# Patient Record
Sex: FEMALE | Race: WHITE | NOT HISPANIC OR LATINO | Employment: OTHER | URBAN - METROPOLITAN AREA
[De-identification: names, ages, dates, MRNs, and addresses within clinical notes are randomized per-mention and may not be internally consistent; named-entity substitution may affect disease eponyms.]

---

## 2020-06-21 ENCOUNTER — TRANSFERRED RECORDS (OUTPATIENT)
Dept: HEALTH INFORMATION MANAGEMENT | Facility: CLINIC | Age: 85
End: 2020-06-21

## 2020-11-03 LAB
CHOLESTEROL (EXTERNAL): 183 MG/DL (ref 0–199)
HDLC SERPL-MCNC: 82 MG/DL
LDL CHOLESTEROL CALCULATED (EXTERNAL): 88 MG/DL (ref 0–130)
TRIGLYCERIDES (EXTERNAL): 64 MG/DL (ref 0–149)

## 2022-07-28 ENCOUNTER — TRANSFERRED RECORDS (OUTPATIENT)
Dept: HEALTH INFORMATION MANAGEMENT | Facility: CLINIC | Age: 87
End: 2022-07-28

## 2022-08-24 ENCOUNTER — OFFICE VISIT (OUTPATIENT)
Dept: OPHTHALMOLOGY | Facility: CLINIC | Age: 87
End: 2022-08-24
Payer: COMMERCIAL

## 2022-08-24 DIAGNOSIS — H40.013 OPEN ANGLE GLAUCOMA CUPPING OF OPTIC DISCS, BILATERAL: ICD-10-CM

## 2022-08-24 DIAGNOSIS — H57.11 BLIND PAINFUL RIGHT EYE: Primary | ICD-10-CM

## 2022-08-24 DIAGNOSIS — H34.8112 STABLE CENTRAL RETINAL VEIN OCCLUSION OF RIGHT EYE (H): ICD-10-CM

## 2022-08-24 DIAGNOSIS — H54.40 BLIND PAINFUL RIGHT EYE: Primary | ICD-10-CM

## 2022-08-24 PROCEDURE — 99204 OFFICE O/P NEW MOD 45 MIN: CPT | Performed by: OPHTHALMOLOGY

## 2022-08-24 RX ORDER — LATANOPROST 50 UG/ML
SOLUTION/ DROPS OPHTHALMIC
Status: ON HOLD | COMMUNITY
Start: 2022-07-07 | End: 2023-12-07

## 2022-08-24 RX ORDER — HYDROCODONE BITARTRATE AND ACETAMINOPHEN 5; 325 MG/1; MG/1
TABLET ORAL
Status: ON HOLD | COMMUNITY
Start: 2022-02-08 | End: 2023-12-04

## 2022-08-24 RX ORDER — TIMOLOL MALEATE 5 MG/ML
SOLUTION/ DROPS OPHTHALMIC
Status: ON HOLD | COMMUNITY
Start: 2022-06-02 | End: 2023-12-07

## 2022-08-24 RX ORDER — ASCORBIC ACID, THIAMINE, RIBOFLAVIN, NIACINAMIDE, PYRIDOXINE, FOLIC ACID, COBALAMIN, BIOTIN, PANTOTHENIC ACID 100; 1.5; 1.7; 20; 10; 1; 6; 300; 1 MG/1; MG/1; MG/1; MG/1; MG/1; MG/1; UG/1; UG/1; MG/1
1 TABLET, COATED ORAL DAILY
COMMUNITY
Start: 2021-07-29

## 2022-08-24 RX ORDER — ATROPINE SULFATE 10 MG/ML
SOLUTION/ DROPS OPHTHALMIC
Status: ON HOLD | COMMUNITY
Start: 2021-11-24 | End: 2023-12-07

## 2022-08-24 RX ORDER — MOMETASONE FUROATE 1 MG/ML
SOLUTION TOPICAL
COMMUNITY
Start: 2021-02-17

## 2022-08-24 RX ORDER — CLOTRIMAZOLE AND BETAMETHASONE DIPROPIONATE 10; .64 MG/G; MG/G
CREAM TOPICAL
COMMUNITY
Start: 2022-07-09

## 2022-08-24 RX ORDER — ESOMEPRAZOLE MAGNESIUM 40 MG/1
20 CAPSULE, DELAYED RELEASE ORAL
COMMUNITY

## 2022-08-24 RX ORDER — AMLODIPINE BESYLATE 5 MG/1
5 TABLET ORAL DAILY
COMMUNITY
Start: 2022-05-28

## 2022-08-24 RX ORDER — LORATADINE 10 MG/1
10 TABLET ORAL
COMMUNITY
Start: 2022-01-13 | End: 2023-01-13

## 2022-08-24 RX ORDER — CARVEDILOL 3.12 MG/1
3.12 TABLET ORAL 2 TIMES DAILY
COMMUNITY
Start: 2022-01-28

## 2022-08-24 RX ORDER — POLYVINYL ALCOHOL 14 MG/ML
1-2 SOLUTION/ DROPS OPHTHALMIC
COMMUNITY

## 2022-08-24 RX ORDER — ALBUTEROL SULFATE 90 UG/1
AEROSOL, METERED RESPIRATORY (INHALATION)
COMMUNITY
Start: 2022-01-13

## 2022-08-24 RX ORDER — BRIMONIDINE TARTRATE 1.5 MG/ML
SOLUTION/ DROPS OPHTHALMIC
Status: ON HOLD | COMMUNITY
Start: 2021-09-16 | End: 2023-12-07

## 2022-08-24 RX ORDER — DORZOLAMIDE HYDROCHLORIDE AND TIMOLOL MALEATE 20; 5 MG/ML; MG/ML
SOLUTION/ DROPS OPHTHALMIC
Status: ON HOLD | COMMUNITY
Start: 2022-07-07 | End: 2023-12-07

## 2022-08-24 RX ORDER — IPRATROPIUM BROMIDE 42 UG/1
SPRAY, METERED NASAL
COMMUNITY
Start: 2022-04-13

## 2022-08-24 RX ORDER — PREDNISONE 10 MG/1
TABLET ORAL
Status: ON HOLD | COMMUNITY
Start: 2022-01-13 | End: 2023-12-04

## 2022-08-24 RX ORDER — PREDNISOLONE ACETATE 10 MG/ML
SUSPENSION/ DROPS OPHTHALMIC
Status: ON HOLD | COMMUNITY
Start: 2021-10-25 | End: 2023-12-07

## 2022-08-24 RX ORDER — FLUOROMETHOLONE 0.1 %
SUSPENSION, DROPS(FINAL DOSAGE FORM)(ML) OPHTHALMIC (EYE)
Status: ON HOLD | COMMUNITY
Start: 2021-09-02 | End: 2023-12-07

## 2022-08-24 ASSESSMENT — SLIT LAMP EXAM - LIDS
COMMENTS: NORMAL
COMMENTS: NORMAL

## 2022-08-24 ASSESSMENT — CONF VISUAL FIELD
OD_SUPERIOR_TEMPORAL_RESTRICTION: 1
OD_SUPERIOR_NASAL_RESTRICTION: 1
OD_INFERIOR_TEMPORAL_RESTRICTION: 1
OD_INFERIOR_NASAL_RESTRICTION: 1

## 2022-08-24 ASSESSMENT — VISUAL ACUITY
METHOD: SNELLEN - LINEAR
OS_SC: 20/100
OD_SC: NLP
OS_SC+: -1

## 2022-08-24 ASSESSMENT — TONOMETRY
OD_IOP_MMHG: 40
OS_IOP_MMHG: 21
IOP_METHOD: TONOPEN

## 2022-08-24 NOTE — LETTER
"    8/24/2022         RE: Anna Marrero  [address] [facility] [address]        Dear Dr. Hodge,    Thank you for referring your patient, Anna Marrero, to the [facility]. Please see a copy of my visit note below.         Chief Complaint(s) and History of Present Illness(es)     enucleation     Comments: Enucleation, Right Eye              Comments     Patient referred by Dr. Hodge to discuss enucleation of right eye.    Glaucoma, right eye. For a long time she has had a \"picking\" sensation in her Right Eye, very painful. Her pressures were high. Has been using drops to lower her pressure, but the pain continues, 8/10. She states that is not interested in removing her eye, but she wants the pain to go away.   Would like to discuss any non-surgical options and wants to know where the pain is coming from if the drops to lower the pressure did not improve the pain.     Right eye: NLP, POGA.   Left eye: only uses Systane and Restasis for dry eye.   Macular Degeneration, each eye.       Royce Hylton     POHx:   Cataract extraction/intraocular lens both eyes 2007 Dr. Duvall  S/BARRETT LTP right eye - 1/10/2006 left eye 2/2006 and 8/2015 Dr. Duvall  S/p mini shunt right eye - 5/2014 Dr. Duvall  S/p Trabeculectomy with mini shunt, mitomycin-C left eye 10/22/2015    Per notes from [place] retinal exam revealed a Central retinal vein occlusion on the right side.    She has tried artificial teras which will take the edge off for 5-10 minutes, though not lead to resolution of pain.          Assessment & Plan     Anna Marrero is a 89 year old female with the following diagnoses:   Encounter Diagnoses   Name Primary?     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 Her father had a \"glass eye\" and he was \"disfigured,\" and she " does not want to have any changes to her appearance. She does note she has pain that is affecting her quality of life.    We discussed right evisceration with implant. I showed her what an ocular prosthesis looks like and discussed that aesthetically it should look good. She will try more conservative measures - such as artificial tear gel drops in addition to her current glaucoma drops. I did give her surgery scheduling number incase she changes her mind and would like to proceed. We did discuss risks as documented at the end of the note.     Surgery would have to be at hospital setting due to pacemaker.   Patient disposition:   No follow-ups on file.        Attending Physician Attestation: Complete documentation of historical and exam elements from today's encounter can be found in the full encounter summary report (not reduplicated in this progress note). I personally obtained the chief complaint(s) and history of present illness. I confirmed and edited as necessary the review of systems, past medical/surgical history, family history, social history, and examination findings as documented by others; and I examined the patient myself. I personally reviewed the relevant tests, images, and reports as documented above. I formulated and edited as necessary the assessment and plan and discussed the findings and management plan with the patient.  -Rosanne George MD    Today with Anna Marrero and her daughter I reviewed the indications, risks, benefits, and alternatives of the proposed surgical procedure including, but not limited to, failure obtain the desired result and need for additional surgery, bleeding, infection, and the remote possibility of permanent damage to any organ system or death with the use of anesthesia. With implants, there is always a risk of implant related complications including exposure, extrusion, infection, and need for more surgery or medications. I provided multiple opportunities for  the questions, answered all questions to the best of my ability, and confirmed that my answers and my discussion were understood.         Again, thank you for allowing me to participate in the care of your patient.        Sincerely,    Rosanne George MD    Oculoplastic and Orbital Surgery   Department of Ophthalmology and Visual Neurosciences  [facility]

## 2022-08-24 NOTE — PROGRESS NOTES
"     Chief Complaint(s) and History of Present Illness(es)     enucleation     Comments: Enucleation, Right Eye              Comments     Patient referred by Dr. Yates to discuss enucleation of right eye.    Glaucoma, right eye. For a long time she has had a \"picking\" sensation in her Right Eye, very painful. Her pressures were high. Has been using drops to lower her pressure, but the pain continues, 8/10. She states that is not interested in removing her eye, but she wants the pain to go away.   Would like to discuss any non-surgical options and wants to know where the pain is coming from if the drops to lower the pressure did not improve the pain.     Right eye: NLP, POGA.   Left eye: only uses Systane and Restasis for dry eye.   Macular Degeneration, each eye.       Royce Hylton     POHx:   Cataract extraction/intraocular lens both eyes 2007 Dr. Jadiel TIRADO/BARRETT LTP right eye - 1/10/2006 left eye 2/2006 and 8/2015 Dr. Jadiel TIRADO/barrett mini shunt right eye - 5/2014 Dr. Duvall  S/barrett Trabeculectomy with mini shunt, mitomycin-C left eye 10/22/2015    Per notes from [place] retinal exam revealed a Central retinal vein occlusion on the right side.    She has tried artificial teras which will take the edge off for 5-10 minutes, though not lead to resolution of pain.          Assessment & Plan     Anna Marrero is a 89 year old female with the following diagnoses:   Encounter Diagnoses   Name Primary?     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 Her father had a \"glass eye\" and he was \"disfigured,\" and she does not want to have any changes to her appearance. She does note she has pain that is affecting her quality of life.    We discussed right evisceration with implant. I showed her what an ocular prosthesis looks like and discussed that aesthetically it " should look good. She will try more conservative measures - such as artificial tear gel drops in addition to her current glaucoma drops. I did give her surgery scheduling number incase she changes her mind and would like to proceed. We did discuss risks as documented at the end of the note.     Surgery would have to be at hospital setting due to pacemaker.   Patient disposition:   No follow-ups on file.        Attending Physician Attestation: Complete documentation of historical and exam elements from today's encounter can be found in the full encounter summary report (not reduplicated in this progress note). I personally obtained the chief complaint(s) and history of present illness. I confirmed and edited as necessary the review of systems, past medical/surgical history, family history, social history, and examination findings as documented by others; and I examined the patient myself. I personally reviewed the relevant tests, images, and reports as documented above. I formulated and edited as necessary the assessment and plan and discussed the findings and management plan with the patient.  -Rosanne George MD    Today with Anna Marrero and her daughter I reviewed the indications, risks, benefits, and alternatives of the proposed surgical procedure including, but not limited to, failure obtain the desired result and need for additional surgery, bleeding, infection, and the remote possibility of permanent damage to any organ system or death with the use of anesthesia. With implants, there is always a risk of implant related complications including exposure, extrusion, infection, and need for more surgery or medications. I provided multiple opportunities for the questions, answered all questions to the best of my ability, and confirmed that my answers and my discussion were understood.

## 2022-08-24 NOTE — NURSING NOTE
"Chief Complaints and History of Present Illnesses   Patient presents with     enucleation     Enucleation, Right Eye       Chief Complaint(s) and History of Present Illness(es)     enucleation     Comments: Enucleation, Right Eye              Comments     Patient referred by Dr. Yates to discuss enucleation of right eye.  Glaucoma, right eye. For a long time she has had a \"picking\" sensation in her Right Eye, very painful. Her pressures were high. Has been using drops to lower her pressure, but the pain continues, 8/10. She states that is not interested in removing her eye, but she wants the pain to go away. Would like to discuss any non-surgical options and wants to know where the pain is coming from if the drops to lower the pressure did not improve the pain.     Right eye: NLP, POGA.   Left eye: only uses Systane and Restasis for dry eye.   Macular Degeneration, each eye.                   Royce Hylton, Ophthalmic Assistant   "

## 2023-01-16 ENCOUNTER — LAB REQUISITION (OUTPATIENT)
Dept: LAB | Facility: CLINIC | Age: 88
End: 2023-01-16

## 2023-01-16 DIAGNOSIS — Z79.899 OTHER LONG TERM (CURRENT) DRUG THERAPY: ICD-10-CM

## 2023-01-17 LAB
ANION GAP SERPL CALCULATED.3IONS-SCNC: 5 MMOL/L (ref 3–14)
BUN SERPL-MCNC: 29 MG/DL (ref 7–30)
CALCIUM SERPL-MCNC: 9.4 MG/DL (ref 8.5–10.1)
CHLORIDE BLD-SCNC: 104 MMOL/L (ref 94–109)
CO2 SERPL-SCNC: 28 MMOL/L (ref 20–32)
CREAT SERPL-MCNC: 0.87 MG/DL (ref 0.52–1.04)
GFR SERPL CREATININE-BSD FRML MDRD: 61 ML/MIN/1.73M2
GLUCOSE BLD-MCNC: 130 MG/DL (ref 70–99)
POTASSIUM BLD-SCNC: 4.6 MMOL/L (ref 3.4–5.3)
SODIUM SERPL-SCNC: 137 MMOL/L (ref 133–144)

## 2023-01-17 PROCEDURE — P9603 ONE-WAY ALLOW PRORATED MILES: HCPCS | Performed by: FAMILY MEDICINE

## 2023-01-17 PROCEDURE — 36415 COLL VENOUS BLD VENIPUNCTURE: CPT | Performed by: FAMILY MEDICINE

## 2023-01-17 PROCEDURE — 80048 BASIC METABOLIC PNL TOTAL CA: CPT | Performed by: FAMILY MEDICINE

## 2023-01-20 ENCOUNTER — LAB REQUISITION (OUTPATIENT)
Dept: LAB | Facility: CLINIC | Age: 88
End: 2023-01-20

## 2023-01-20 DIAGNOSIS — Z79.899 OTHER LONG TERM (CURRENT) DRUG THERAPY: ICD-10-CM

## 2023-01-20 LAB
ERYTHROCYTE [DISTWIDTH] IN BLOOD BY AUTOMATED COUNT: 13.8 % (ref 10–15)
HCT VFR BLD AUTO: 42 % (ref 35–47)
HGB BLD-MCNC: 13.9 G/DL (ref 11.7–15.7)
MCH RBC QN AUTO: 29.6 PG (ref 26.5–33)
MCHC RBC AUTO-ENTMCNC: 33.1 G/DL (ref 31.5–36.5)
MCV RBC AUTO: 89 FL (ref 78–100)
NT-PROBNP SERPL-MCNC: 1989 PG/ML (ref 0–1800)
PLAT MORPH BLD: ABNORMAL
PLATELET # BLD AUTO: 147 10E3/UL (ref 150–450)
RBC # BLD AUTO: 4.7 10E6/UL (ref 3.8–5.2)
RBC MORPH BLD: ABNORMAL
WBC # BLD AUTO: 7.3 10E3/UL (ref 4–11)

## 2023-01-20 PROCEDURE — 36415 COLL VENOUS BLD VENIPUNCTURE: CPT | Performed by: FAMILY MEDICINE

## 2023-01-20 PROCEDURE — 83880 ASSAY OF NATRIURETIC PEPTIDE: CPT | Performed by: FAMILY MEDICINE

## 2023-01-20 PROCEDURE — P9603 ONE-WAY ALLOW PRORATED MILES: HCPCS | Performed by: FAMILY MEDICINE

## 2023-01-20 PROCEDURE — 85027 COMPLETE CBC AUTOMATED: CPT | Performed by: FAMILY MEDICINE

## 2023-01-24 ENCOUNTER — LAB REQUISITION (OUTPATIENT)
Dept: LAB | Facility: CLINIC | Age: 88
End: 2023-01-24

## 2023-01-24 DIAGNOSIS — Z79.899 OTHER LONG TERM (CURRENT) DRUG THERAPY: ICD-10-CM

## 2023-01-24 LAB
MAGNESIUM SERPL-MCNC: 2 MG/DL (ref 1.7–2.3)
NT-PROBNP SERPL-MCNC: 2698 PG/ML (ref 0–1800)

## 2023-01-24 PROCEDURE — 83880 ASSAY OF NATRIURETIC PEPTIDE: CPT | Performed by: FAMILY MEDICINE

## 2023-01-24 PROCEDURE — 83735 ASSAY OF MAGNESIUM: CPT | Performed by: FAMILY MEDICINE

## 2023-01-24 PROCEDURE — 36415 COLL VENOUS BLD VENIPUNCTURE: CPT | Performed by: FAMILY MEDICINE

## 2023-01-24 PROCEDURE — P9603 ONE-WAY ALLOW PRORATED MILES: HCPCS | Performed by: FAMILY MEDICINE

## 2023-01-27 ENCOUNTER — LAB REQUISITION (OUTPATIENT)
Dept: LAB | Facility: CLINIC | Age: 88
End: 2023-01-27

## 2023-01-27 DIAGNOSIS — Z79.899 OTHER LONG TERM (CURRENT) DRUG THERAPY: ICD-10-CM

## 2023-01-27 LAB
ANION GAP SERPL CALCULATED.3IONS-SCNC: 9 MMOL/L (ref 7–15)
BUN SERPL-MCNC: 18.7 MG/DL (ref 8–23)
CALCIUM SERPL-MCNC: 8.9 MG/DL (ref 8.2–9.6)
CHLORIDE SERPL-SCNC: 100 MMOL/L (ref 98–107)
CREAT SERPL-MCNC: 0.9 MG/DL (ref 0.51–0.95)
DEPRECATED HCO3 PLAS-SCNC: 26 MMOL/L (ref 22–29)
GFR SERPL CREATININE-BSD FRML MDRD: 59 ML/MIN/1.73M2
GLUCOSE SERPL-MCNC: 110 MG/DL (ref 70–99)
POTASSIUM SERPL-SCNC: 4 MMOL/L (ref 3.4–5.3)
SODIUM SERPL-SCNC: 135 MMOL/L (ref 136–145)

## 2023-01-27 PROCEDURE — P9603 ONE-WAY ALLOW PRORATED MILES: HCPCS | Performed by: FAMILY MEDICINE

## 2023-01-27 PROCEDURE — 80048 BASIC METABOLIC PNL TOTAL CA: CPT | Performed by: FAMILY MEDICINE

## 2023-01-27 PROCEDURE — 36415 COLL VENOUS BLD VENIPUNCTURE: CPT | Performed by: FAMILY MEDICINE

## 2023-01-30 ENCOUNTER — LAB REQUISITION (OUTPATIENT)
Dept: LAB | Facility: CLINIC | Age: 88
End: 2023-01-30

## 2023-01-30 DIAGNOSIS — Z79.899 OTHER LONG TERM (CURRENT) DRUG THERAPY: ICD-10-CM

## 2023-01-31 LAB — SODIUM SERPL-SCNC: 135 MMOL/L (ref 136–145)

## 2023-01-31 PROCEDURE — 36415 COLL VENOUS BLD VENIPUNCTURE: CPT | Performed by: FAMILY MEDICINE

## 2023-01-31 PROCEDURE — P9603 ONE-WAY ALLOW PRORATED MILES: HCPCS | Performed by: FAMILY MEDICINE

## 2023-01-31 PROCEDURE — 84295 ASSAY OF SERUM SODIUM: CPT | Performed by: FAMILY MEDICINE

## 2023-02-09 LAB
ALT SERPL-CCNC: 19 U/L (ref 14–63)
AST SERPL-CCNC: 24 U/L (ref 15–37)

## 2023-07-24 LAB — INR (EXTERNAL): 1.2 (ref 0.9–1.1)

## 2023-10-26 ENCOUNTER — TRANSFERRED RECORDS (OUTPATIENT)
Dept: HEALTH INFORMATION MANAGEMENT | Facility: CLINIC | Age: 88
End: 2023-10-26
Payer: COMMERCIAL

## 2023-11-10 ENCOUNTER — TRANSCRIBE ORDERS (OUTPATIENT)
Dept: OTHER | Age: 88
End: 2023-11-10

## 2023-11-10 DIAGNOSIS — H40.1113 PRIMARY OPEN ANGLE GLAUCOMA OF RIGHT EYE, SEVERE STAGE: Primary | ICD-10-CM

## 2023-11-13 NOTE — TELEPHONE ENCOUNTER
FUTURE VISIT INFORMATION      FUTURE VISIT INFORMATION:  Date: 11/14/23  Time: 2:45pm  Location: [facility]  REFERRAL INFORMATION:  Reason for visit/diagnosis  Follow up enucleation, eye pain; recent recs being sent from Dr. Yates @ [facility] eye [facility],     RECORDS REQUESTED FROM:       Clinic name Comments Records Status Imaging Status   [facility] Recs scanned into chart under 10/26/23 Epic

## 2023-11-14 ENCOUNTER — TELEPHONE (OUTPATIENT)
Dept: OPHTHALMOLOGY | Facility: CLINIC | Age: 88
End: 2023-11-14

## 2023-11-14 ENCOUNTER — OFFICE VISIT (OUTPATIENT)
Dept: OPHTHALMOLOGY | Facility: CLINIC | Age: 88
End: 2023-11-14
Payer: COMMERCIAL

## 2023-11-14 ENCOUNTER — PRE VISIT (OUTPATIENT)
Dept: OPHTHALMOLOGY | Facility: CLINIC | Age: 88
End: 2023-11-14

## 2023-11-14 DIAGNOSIS — H57.10 BLIND PAINFUL EYE: Primary | ICD-10-CM

## 2023-11-14 DIAGNOSIS — H54.40 BLIND PAINFUL EYE: Primary | ICD-10-CM

## 2023-11-14 DIAGNOSIS — H40.1113 PRIMARY OPEN ANGLE GLAUCOMA OF RIGHT EYE, SEVERE STAGE: ICD-10-CM

## 2023-11-14 PROCEDURE — 99214 OFFICE O/P EST MOD 30 MIN: CPT | Mod: GC | Performed by: OPHTHALMOLOGY

## 2023-11-14 RX ORDER — ASPIRIN 81 MG/1
81 TABLET, CHEWABLE ORAL DAILY
COMMUNITY

## 2023-11-14 RX ORDER — OXYCODONE HYDROCHLORIDE 5 MG/1
5 TABLET ORAL EVERY 6 HOURS PRN
Qty: 25 TABLET | Refills: 0 | Status: SHIPPED | OUTPATIENT
Start: 2023-11-14 | End: 2023-11-24

## 2023-11-14 RX ORDER — AMOXICILLIN 250 MG
CAPSULE ORAL
Status: ON HOLD | COMMUNITY
End: 2023-12-07

## 2023-11-14 ASSESSMENT — CONF VISUAL FIELD
METHOD: COUNTING FINGERS
OD_INFERIOR_NASAL_RESTRICTION: 1
OS_SUPERIOR_TEMPORAL_RESTRICTION: 0
OS_INFERIOR_NASAL_RESTRICTION: 0
OD_SUPERIOR_NASAL_RESTRICTION: 1
OS_SUPERIOR_NASAL_RESTRICTION: 0
OD_INFERIOR_TEMPORAL_RESTRICTION: 1
OS_INFERIOR_TEMPORAL_RESTRICTION: 0
OS_NORMAL: 1
OD_SUPERIOR_TEMPORAL_RESTRICTION: 1

## 2023-11-14 ASSESSMENT — SLIT LAMP EXAM - LIDS
COMMENTS: NORMAL
COMMENTS: NORMAL

## 2023-11-14 ASSESSMENT — VISUAL ACUITY
METHOD: SNELLEN - LINEAR
OS_SC+: -1
OS_SC: 20/80
OD_SC: NLP

## 2023-11-14 ASSESSMENT — TONOMETRY
IOP_METHOD: ICARE
OD_IOP_MMHG: 52
OS_IOP_MMHG: 10

## 2023-11-14 NOTE — TELEPHONE ENCOUNTER
Date Scheduled: 12-4-23  Facility: Surgery Locations: [facility]  Surgeon: Dr. George   Post-op appointment scheduled: 12-8-23 [facility]   scheduled?: No  Surgery packet/instructions confirmed received?  Yes-mailed  Pre op physical/PAC appointment:   Special Considerations:     Nieves Almanza  Surgery Scheduling Coordinator  Ph: [phone]

## 2023-11-14 NOTE — PROGRESS NOTES
Chief Complaint(s) and History of Present Illness(es)     Consult For            Laterality: right eye    Onset: 3 weeks ago    Course: gradually worsening    Associated symptoms: eye pain, tearing, headache and foreign body   sensation    Treatments tried: eye drops and artificial tears    Pain scale: 6/10 (Currently)    Comments: Eye pain, right eye          Comments    She states that she has been having worsening eye pain in the past 3   weeks.   Her pain ranges from a 6-10 in the right eye.  Using artificial   tears does help temporarily.  Currently she has a headache.  Sometimes she   has a headache around her eyes.    Ena Hylton, COT 2:04 PM  November 14, 2023     Assessment & Plan     Anna Marrero is a 89 year old female with the following diagnoses:   Encounter Diagnosis   Name Primary?    Primary open angle glaucoma of right eye, severe stage      - Painful, NLP right eye (describes FBS and sharp pain)   - Pain has been getting worse   - Saw an ophthalmologist in [place] who recommended retrobulbar alcohol injection   - Patient wants to explore alternatives to evisceration or enucleation   - Patient on latanoprost and cosopt     Rebekah Crump MD  PGY-2  Department of Ophthalmology  November 14, 2023 2:28 PM     Very uncomfortable.   Blind painful right eye.  Discussed options of retrobulbar alcohol or thorazine vs surgical options.  I would recommend surgical options at this time.  Plan: Right eye evisceration with implant. Will plan overnight observation admission, but can go home if feeling well.   I did send her home with as needed oxy, encouraged her to minimize utilization. Discussed would not plan to refill, but can discuss with primary care physician if decides not to pursue surgery.      Attending Physician Attestation: Complete documentation of historical and exam elements from today's encounter can be found in the full encounter summary report (not reduplicated in this progress note).  I personally obtained the chief complaint(s) and history of present illness. I confirmed and edited as necessary the review of systems, past medical/surgical history, family history, social history, and examination findings as documented by others; and I examined the patient myself. I personally reviewed the relevant tests, images, and reports as documented above. I formulated and edited as necessary the assessment and plan and discussed the findings and management plan with the patient.  -Rosanne George MD      Today with Anna Marrero and her daughters I reviewed the indications, risks, benefits, and alternatives of the proposed surgical procedure including, but not limited to, failure obtain the desired result and need for additional surgery, bleeding, infection, and the remote possibility of permanent damage to any organ system or death with the use of anesthesia. With implants, there is always a risk of implant related complications including exposure, extrusion, infection, and need for more surgery or medications. I provided multiple opportunities for the questions, answered all questions to the best of my ability, and confirmed that my answers and my discussion were understood.

## 2023-11-14 NOTE — TELEPHONE ENCOUNTER
Spoke with patient's daughter regarding offered earlier same day appointment. Patient declined scheduling as offered.  Confirmed appointment details.-Per Patient's Daughter

## 2023-11-14 NOTE — LETTER
2023         RE:  :  MRN: Anna Marrero  1927  [id number]     Dear Dr. Nile Yates,    Thank you for asking me to see your patient, Anna Marrero, for an oculoplastic   consultation.  My assessment and plan are below.  For further details, please see my attached clinic note.      Chief Complaint(s) and History of Present Illness(es)     Consult For            Laterality: right eye    Onset: 3 weeks ago    Course: gradually worsening    Associated symptoms: eye pain, tearing, headache and foreign body   sensation    Treatments tried: eye drops and artificial tears    Pain scale: 6/10 (Currently)    Comments: Eye pain, right eye          Comments    She states that she has been having worsening eye pain in the past 3   weeks.   Her pain ranges from a 6-10 in the right eye.  Using artificial   tears does help temporarily.  Currently she has a headache.  Sometimes she   has a headache around her eyes.    Ena Hylton, COT 2:04 PM  2023     Assessment & Plan     Anna Marrero is a 89 year old female with the following diagnoses:   Encounter Diagnosis   Name Primary?    Primary open angle glaucoma of right eye, severe stage      - Painful, NLP right eye (describes FBS and sharp pain)   - Pain has been getting worse   - Saw an ophthalmologist in [place] who recommended retrobulbar alcohol injection   - Patient wants to explore alternatives to evisceration or enucleation   - Patient on latanoprost and cosopt     Rebekah Crump MD  PGY-2  Department of Ophthalmology  2023 2:28 PM     Very uncomfortable.   Blind painful right eye.  Discussed options of retrobulbar alcohol or thorazine vs surgical options.  I would recommend surgical options at this time.  Plan: Right eye evisceration with implant. Will plan overnight observation admission, but can go home if feeling well.   I did send her home with as needed oxy, encouraged her to minimize utilization. Discussed would not  plan to refill, but can discuss with primary care physician if decides not to pursue surgery.       Again, thank you for allowing me to participate in the care of your patient.      Sincerely,    Rosanne George MD  Department of Ophthalmology and Visual Neurosciences  [facility]    CC: CARMEN VIERA  [facility]  [address] [facility] [address]  Via Fax: [phone]

## 2023-11-14 NOTE — NURSING NOTE
Chief Complaints and History of Present Illnesses   Patient presents with    Consult For     Eye pain, right eye     Chief Complaint(s) and History of Present Illness(es)       Consult For              Laterality: right eye    Onset: 3 weeks ago    Course: gradually worsening    Associated symptoms: eye pain, tearing, headache and foreign body sensation    Treatments tried: eye drops and artificial tears    Pain scale: 6/10 (Currently)    Comments: Eye pain, right eye              Comments    She states that she has been having worsening eye pain in the past 3 weeks.   Her pain ranges from a 6-10 in the right eye.  Using artificial tears does help temporarily.  Currently she has a headache.  Sometimes she has a headache around her eyes.    Ena Hylton, COT 2:04 PM  November 14, 2023

## 2023-11-16 NOTE — TELEPHONE ENCOUNTER
Received call from patient asking for a surgery packet to be mailed with surgery information. I let her know that the packet was mailed yesterday, so she should get it in the next couple of days. She also confirmed that she needs a pre op physical with her PCP, but asked if she could do that in [place]. I let her know that she could go to any location before surgery.     Nieves Almanza  Surgery Scheduling Coordinator  Ph: [phone]

## 2023-11-29 RX ORDER — OXYCODONE HYDROCHLORIDE 5 MG/1
5 CAPSULE ORAL EVERY 4 HOURS PRN
Status: ON HOLD | COMMUNITY
End: 2023-12-04

## 2023-11-29 RX ORDER — FUROSEMIDE 40 MG
TABLET ORAL
COMMUNITY
Start: 2023-06-05

## 2023-11-29 RX ORDER — POTASSIUM CHLORIDE 750 MG/1
TABLET, EXTENDED RELEASE ORAL
COMMUNITY
Start: 2023-02-09

## 2023-12-03 ENCOUNTER — ANESTHESIA EVENT (OUTPATIENT)
Dept: SURGERY | Facility: CLINIC | Age: 88
DRG: 113 | End: 2023-12-03
Payer: COMMERCIAL

## 2023-12-03 NOTE — ANESTHESIA PREPROCEDURE EVALUATION
Anesthesia Pre-Procedure Evaluation    Patient: Anna Marrero   MRN: [id number] : 1927        Procedure : Procedure(s):  Right eye evisceration with implant          No past medical history on file.   Past Surgical History:   Procedure Laterality Date    CATARACT IOL, RT/LT      GLAUCOMA SURGERY        Allergies   Allergen Reactions    Cefaclor Other (See Comments) and Rash     Edema and redness    Buspirone      Other reaction(s): Unknown Reaction  dizziness    Hydrochlorothiazide      Other reaction(s): Unknown Reaction    Lisinopril      Other reaction(s): Unknown Reaction  cough    Other Drug Allergy (See Comments) Other (See Comments)     Increases BP      Social History     Tobacco Use    Smoking status: Never    Smokeless tobacco: Never   Substance Use Topics    Alcohol use: Not on file      Wt Readings from Last 1 Encounters:   No data found for Wt        Anesthesia Evaluation   Pt has had prior anesthetic.         ROS/MED HX  ENT/Pulmonary: Comment: SOB with exertion.      Neurologic:     (+)      migraines,                          Cardiovascular:     (+)  hypertension- -   -  - -      CHF etiology: diastolic dysfunction    DOZIER.   pacemaker,  type: Pacemaker, settings: DDDR, - Patient is dependent on pacemaker.         valvular problems/murmurs type: AS s/p TAVR.    Previous cardiac testing   Echo: Date:  Results:  Technically difficult study with poor acoustic windows, which limits   assessment.    * Left ventricle is normal size with normal systolic function, ejection   fraction 60 to 65%.     * Right ventricle is normal size with normal systolic function.     * Status post TAVR aortic valve with a peak velocity of 1.7 m/s and mean   gradient of 6.4 mmHg.     * No pericardial effusion.     * When compared to the prior echocardiogram done on 2023, no   significant changes seen in the overall left ventricular systolic function.       Stress Test:  Date: Results:    ECG Reviewed:  Date:  "Results:    Cath:  Date: Results:      METS/Exercise Tolerance: 1 - Eating, dressing    Hematologic:       Musculoskeletal:   (+)  arthritis,             GI/Hepatic: Comment: H/o lower GI bleeding, angiodysplasia of the SB.       Renal/Genitourinary:  - neg Renal ROS     Endo:  - neg endo ROS     Psychiatric/Substance Use:     (+) psychiatric history depression       Infectious Disease:       Malignancy:   (+) Malignancy, History of Skin.    Other:      (+)  , H/O Chronic Pain,         Physical Exam    Airway        Mallampati: III   TM distance: > 3 FB   Neck ROM: limited   Mouth opening: > 3 cm    Respiratory Devices and Support         Dental       (+) Multiple visibly decayed, broken teeth    B=Bridge, C=Chipped, L=Loose, M=Missing    Cardiovascular   cardiovascular exam normal          Pulmonary   pulmonary exam normal                OUTSIDE LABS:  CBC: No results found for: \"WBC\", \"HGB\", \"HCT\", \"PLT\"  BMP: No results found for: \"NA\", \"POTASSIUM\", \"CHLORIDE\", \"CO2\", \"BUN\", \"CR\", \"GLC\"  COAGS: No results found for: \"PTT\", \"INR\", \"FIBR\"  POC: No results found for: \"BGM\", \"HCG\", \"HCGS\"  HEPATIC: No results found for: \"ALBUMIN\", \"PROTTOTAL\", \"ALT\", \"AST\", \"GGT\", \"ALKPHOS\", \"BILITOTAL\", \"BILIDIRECT\", \"KIRSTIN\"  OTHER: No results found for: \"PH\", \"LACT\", \"A1C\", \"LEW\", \"PHOS\", \"MAG\", \"LIPASE\", \"AMYLASE\", \"TSH\", \"T4\", \"T3\", \"CRP\", \"SED\"    Anesthesia Plan    ASA Status:  4    NPO Status:  NPO Appropriate    Anesthesia Type: General.     - Airway: LMA   Induction: Intravenous.   Maintenance: Balanced.   Techniques and Equipment:     - Airway: Video-Laryngoscope       Consents            Postoperative Care    Pain management: Multi-modal analgesia.   PONV prophylaxis: Ondansetron (or other 5HT-3), Dexamethasone or Solumedrol     Comments:    Other Comments: NO Versed.            Eden Faustin MD    I have reviewed the pertinent notes and labs in the chart from the past 30 days and (re)examined the patient.  Any " updates or changes from those notes are reflected in this note.

## 2023-12-04 ENCOUNTER — ANESTHESIA (OUTPATIENT)
Dept: SURGERY | Facility: CLINIC | Age: 88
DRG: 113 | End: 2023-12-04
Payer: COMMERCIAL

## 2023-12-04 ENCOUNTER — HOSPITAL ENCOUNTER (INPATIENT)
Facility: CLINIC | Age: 88
LOS: 3 days | Discharge: HOME HEALTH | DRG: 113 | End: 2023-12-08
Attending: OPHTHALMOLOGY | Admitting: OPHTHALMOLOGY
Payer: COMMERCIAL

## 2023-12-04 DIAGNOSIS — H54.40 BLIND PAINFUL RIGHT EYE: Primary | ICD-10-CM

## 2023-12-04 DIAGNOSIS — H57.11 BLIND PAINFUL RIGHT EYE: Primary | ICD-10-CM

## 2023-12-04 DIAGNOSIS — Z98.890 POSTOPERATIVE EYE STATE: ICD-10-CM

## 2023-12-04 LAB
ANION GAP SERPL CALCULATED.3IONS-SCNC: 9 MMOL/L (ref 7–15)
BUN SERPL-MCNC: 15.7 MG/DL (ref 8–23)
CALCIUM SERPL-MCNC: 9.5 MG/DL (ref 8.2–9.6)
CHLORIDE SERPL-SCNC: 101 MMOL/L (ref 98–107)
CREAT SERPL-MCNC: 0.8 MG/DL (ref 0.51–0.95)
DEPRECATED HCO3 PLAS-SCNC: 28 MMOL/L (ref 22–29)
EGFRCR SERPLBLD CKD-EPI 2021: 67 ML/MIN/1.73M2
GLUCOSE SERPL-MCNC: 94 MG/DL (ref 70–99)
POTASSIUM SERPL-SCNC: 3.6 MMOL/L (ref 3.4–5.3)
SODIUM SERPL-SCNC: 138 MMOL/L (ref 135–145)

## 2023-12-04 PROCEDURE — 258N000003 HC RX IP 258 OP 636: Performed by: NURSE ANESTHETIST, CERTIFIED REGISTERED

## 2023-12-04 PROCEDURE — 250N000011 HC RX IP 250 OP 636: Performed by: OPHTHALMOLOGY

## 2023-12-04 PROCEDURE — 08QNXZZ REPAIR RIGHT UPPER EYELID, EXTERNAL APPROACH: ICD-10-PCS | Performed by: OPHTHALMOLOGY

## 2023-12-04 PROCEDURE — 250N000013 HC RX MED GY IP 250 OP 250 PS 637: Performed by: ANESTHESIOLOGY

## 2023-12-04 PROCEDURE — 250N000011 HC RX IP 250 OP 636

## 2023-12-04 PROCEDURE — L8610 OCULAR IMPLANT: HCPCS | Performed by: OPHTHALMOLOGY

## 2023-12-04 PROCEDURE — 08QQXZZ REPAIR RIGHT LOWER EYELID, EXTERNAL APPROACH: ICD-10-PCS | Performed by: OPHTHALMOLOGY

## 2023-12-04 PROCEDURE — 99222 1ST HOSP IP/OBS MODERATE 55: CPT | Mod: AI | Performed by: INTERNAL MEDICINE

## 2023-12-04 PROCEDURE — 65093 REVISE EYE WITH IMPLANT: CPT | Mod: RT | Performed by: OPHTHALMOLOGY

## 2023-12-04 PROCEDURE — 250N000009 HC RX 250: Performed by: NURSE ANESTHETIST, CERTIFIED REGISTERED

## 2023-12-04 PROCEDURE — 250N000011 HC RX IP 250 OP 636: Mod: JZ | Performed by: ANESTHESIOLOGY

## 2023-12-04 PROCEDURE — 250N000009 HC RX 250: Performed by: OPHTHALMOLOGY

## 2023-12-04 PROCEDURE — 710N000010 HC RECOVERY PHASE 1, LEVEL 2, PER MIN: Performed by: OPHTHALMOLOGY

## 2023-12-04 PROCEDURE — 250N000011 HC RX IP 250 OP 636: Performed by: NURSE ANESTHETIST, CERTIFIED REGISTERED

## 2023-12-04 PROCEDURE — 67875 CLOSURE OF EYELID BY SUTURE: CPT | Mod: RT | Performed by: OPHTHALMOLOGY

## 2023-12-04 PROCEDURE — 250N000025 HC SEVOFLURANE, PER MIN: Performed by: OPHTHALMOLOGY

## 2023-12-04 PROCEDURE — 258N000003 HC RX IP 258 OP 636: Performed by: OPHTHALMOLOGY

## 2023-12-04 PROCEDURE — 93005 ELECTROCARDIOGRAM TRACING: CPT | Performed by: ANESTHESIOLOGY

## 2023-12-04 PROCEDURE — 370N000017 HC ANESTHESIA TECHNICAL FEE, PER MIN: Performed by: OPHTHALMOLOGY

## 2023-12-04 PROCEDURE — 272N000001 HC OR GENERAL SUPPLY STERILE: Performed by: OPHTHALMOLOGY

## 2023-12-04 PROCEDURE — 08R00JZ REPLACEMENT OF RIGHT EYE WITH SYNTHETIC SUBSTITUTE, OPEN APPROACH: ICD-10-PCS | Performed by: OPHTHALMOLOGY

## 2023-12-04 PROCEDURE — 93010 ELECTROCARDIOGRAM REPORT: CPT | Performed by: INTERNAL MEDICINE

## 2023-12-04 PROCEDURE — 80048 BASIC METABOLIC PNL TOTAL CA: CPT | Performed by: ANESTHESIOLOGY

## 2023-12-04 PROCEDURE — 36415 COLL VENOUS BLD VENIPUNCTURE: CPT | Performed by: ANESTHESIOLOGY

## 2023-12-04 PROCEDURE — 250N000011 HC RX IP 250 OP 636: Mod: JZ | Performed by: INTERNAL MEDICINE

## 2023-12-04 PROCEDURE — 999N000141 HC STATISTIC PRE-PROCEDURE NURSING ASSESSMENT: Performed by: OPHTHALMOLOGY

## 2023-12-04 PROCEDURE — 250N000013 HC RX MED GY IP 250 OP 250 PS 637: Performed by: INTERNAL MEDICINE

## 2023-12-04 PROCEDURE — G0378 HOSPITAL OBSERVATION PER HR: HCPCS

## 2023-12-04 PROCEDURE — 88304 TISSUE EXAM BY PATHOLOGIST: CPT | Mod: 26 | Performed by: OPHTHALMOLOGY

## 2023-12-04 PROCEDURE — 88313 SPECIAL STAINS GROUP 2: CPT | Mod: TC | Performed by: OPHTHALMOLOGY

## 2023-12-04 PROCEDURE — 360N000077 HC SURGERY LEVEL 4, PER MIN: Performed by: OPHTHALMOLOGY

## 2023-12-04 DEVICE — EYE IMP SPHERE SILICONE 18MM S6.1018U: Type: IMPLANTABLE DEVICE | Site: EYE | Status: FUNCTIONAL

## 2023-12-04 RX ORDER — NALOXONE HYDROCHLORIDE 0.4 MG/ML
0.2 INJECTION, SOLUTION INTRAMUSCULAR; INTRAVENOUS; SUBCUTANEOUS
Status: DISCONTINUED | OUTPATIENT
Start: 2023-12-04 | End: 2023-12-08 | Stop reason: HOSPADM

## 2023-12-04 RX ORDER — SODIUM CHLORIDE, SODIUM LACTATE, POTASSIUM CHLORIDE, CALCIUM CHLORIDE 600; 310; 30; 20 MG/100ML; MG/100ML; MG/100ML; MG/100ML
INJECTION, SOLUTION INTRAVENOUS CONTINUOUS
Status: DISCONTINUED | OUTPATIENT
Start: 2023-12-04 | End: 2023-12-04 | Stop reason: HOSPADM

## 2023-12-04 RX ORDER — ERYTHROMYCIN 5 MG/G
OINTMENT OPHTHALMIC
Qty: 3.5 G | Refills: 1 | Status: SHIPPED | OUTPATIENT
Start: 2023-12-04 | End: 2023-12-08

## 2023-12-04 RX ORDER — ALBUTEROL SULFATE 90 UG/1
1 AEROSOL, METERED RESPIRATORY (INHALATION) EVERY 6 HOURS PRN
Status: DISCONTINUED | OUTPATIENT
Start: 2023-12-04 | End: 2023-12-08 | Stop reason: HOSPADM

## 2023-12-04 RX ORDER — ONDANSETRON 4 MG/1
4 TABLET, ORALLY DISINTEGRATING ORAL EVERY 30 MIN PRN
Status: DISCONTINUED | OUTPATIENT
Start: 2023-12-04 | End: 2023-12-04 | Stop reason: HOSPADM

## 2023-12-04 RX ORDER — LIDOCAINE HYDROCHLORIDE 20 MG/ML
INJECTION, SOLUTION INFILTRATION; PERINEURAL PRN
Status: DISCONTINUED | OUTPATIENT
Start: 2023-12-04 | End: 2023-12-04

## 2023-12-04 RX ORDER — VITAMIN B COMPLEX
2000 TABLET ORAL DAILY
Status: DISCONTINUED | OUTPATIENT
Start: 2023-12-05 | End: 2023-12-08 | Stop reason: HOSPADM

## 2023-12-04 RX ORDER — OXYCODONE HYDROCHLORIDE 5 MG/1
5 CAPSULE ORAL EVERY 6 HOURS PRN
Qty: 15 CAPSULE | Refills: 0 | Status: SHIPPED | OUTPATIENT
Start: 2023-12-04 | End: 2023-12-08

## 2023-12-04 RX ORDER — PANTOPRAZOLE SODIUM 40 MG/1
40 TABLET, DELAYED RELEASE ORAL
Status: DISCONTINUED | OUTPATIENT
Start: 2023-12-05 | End: 2023-12-08 | Stop reason: HOSPADM

## 2023-12-04 RX ORDER — HYDROMORPHONE HYDROCHLORIDE 1 MG/ML
0.2 INJECTION, SOLUTION INTRAMUSCULAR; INTRAVENOUS; SUBCUTANEOUS EVERY 5 MIN PRN
Status: DISCONTINUED | OUTPATIENT
Start: 2023-12-04 | End: 2023-12-04 | Stop reason: HOSPADM

## 2023-12-04 RX ORDER — NALOXONE HYDROCHLORIDE 0.4 MG/ML
0.4 INJECTION, SOLUTION INTRAMUSCULAR; INTRAVENOUS; SUBCUTANEOUS
Status: DISCONTINUED | OUTPATIENT
Start: 2023-12-04 | End: 2023-12-08 | Stop reason: HOSPADM

## 2023-12-04 RX ORDER — ONDANSETRON 2 MG/ML
INJECTION INTRAMUSCULAR; INTRAVENOUS PRN
Status: DISCONTINUED | OUTPATIENT
Start: 2023-12-04 | End: 2023-12-04

## 2023-12-04 RX ORDER — ONDANSETRON 2 MG/ML
4 INJECTION INTRAMUSCULAR; INTRAVENOUS EVERY 6 HOURS PRN
Status: DISCONTINUED | OUTPATIENT
Start: 2023-12-04 | End: 2023-12-08 | Stop reason: HOSPADM

## 2023-12-04 RX ORDER — ACETAMINOPHEN 325 MG/1
650 TABLET ORAL ONCE
Status: COMPLETED | OUTPATIENT
Start: 2023-12-04 | End: 2023-12-04

## 2023-12-04 RX ORDER — FENTANYL CITRATE 50 UG/ML
INJECTION, SOLUTION INTRAMUSCULAR; INTRAVENOUS PRN
Status: DISCONTINUED | OUTPATIENT
Start: 2023-12-04 | End: 2023-12-04

## 2023-12-04 RX ORDER — FUROSEMIDE 40 MG
40 TABLET ORAL DAILY
Status: DISCONTINUED | OUTPATIENT
Start: 2023-12-05 | End: 2023-12-08 | Stop reason: HOSPADM

## 2023-12-04 RX ORDER — LABETALOL HYDROCHLORIDE 5 MG/ML
10 INJECTION, SOLUTION INTRAVENOUS EVERY 4 HOURS PRN
Status: DISCONTINUED | OUTPATIENT
Start: 2023-12-04 | End: 2023-12-08 | Stop reason: HOSPADM

## 2023-12-04 RX ORDER — HYDRALAZINE HYDROCHLORIDE 20 MG/ML
5 INJECTION INTRAMUSCULAR; INTRAVENOUS ONCE
Status: COMPLETED | OUTPATIENT
Start: 2023-12-04 | End: 2023-12-04

## 2023-12-04 RX ORDER — IPRATROPIUM BROMIDE 42 UG/1
2 SPRAY, METERED NASAL 3 TIMES DAILY PRN
Status: DISCONTINUED | OUTPATIENT
Start: 2023-12-04 | End: 2023-12-08 | Stop reason: HOSPADM

## 2023-12-04 RX ORDER — CARVEDILOL 3.12 MG/1
3.12 TABLET ORAL 2 TIMES DAILY
Status: DISCONTINUED | OUTPATIENT
Start: 2023-12-04 | End: 2023-12-08 | Stop reason: HOSPADM

## 2023-12-04 RX ORDER — ACETAMINOPHEN 325 MG/1
650 TABLET ORAL EVERY 4 HOURS PRN
Status: DISCONTINUED | OUTPATIENT
Start: 2023-12-04 | End: 2023-12-05

## 2023-12-04 RX ORDER — SODIUM CHLORIDE, SODIUM LACTATE, POTASSIUM CHLORIDE, CALCIUM CHLORIDE 600; 310; 30; 20 MG/100ML; MG/100ML; MG/100ML; MG/100ML
INJECTION, SOLUTION INTRAVENOUS CONTINUOUS PRN
Status: DISCONTINUED | OUTPATIENT
Start: 2023-12-04 | End: 2023-12-04

## 2023-12-04 RX ORDER — CEFAZOLIN SODIUM/WATER 2 G/20 ML
2 SYRINGE (ML) INTRAVENOUS SEE ADMIN INSTRUCTIONS
Status: DISCONTINUED | OUTPATIENT
Start: 2023-12-04 | End: 2023-12-04 | Stop reason: HOSPADM

## 2023-12-04 RX ORDER — ONDANSETRON 2 MG/ML
4 INJECTION INTRAMUSCULAR; INTRAVENOUS EVERY 30 MIN PRN
Status: DISCONTINUED | OUTPATIENT
Start: 2023-12-04 | End: 2023-12-04 | Stop reason: HOSPADM

## 2023-12-04 RX ORDER — POTASSIUM CHLORIDE 750 MG/1
10 TABLET, EXTENDED RELEASE ORAL DAILY
Status: DISCONTINUED | OUTPATIENT
Start: 2023-12-05 | End: 2023-12-08 | Stop reason: HOSPADM

## 2023-12-04 RX ORDER — CEFAZOLIN SODIUM/WATER 2 G/20 ML
2 SYRINGE (ML) INTRAVENOUS
Status: COMPLETED | OUTPATIENT
Start: 2023-12-04 | End: 2023-12-04

## 2023-12-04 RX ORDER — FENTANYL CITRATE 50 UG/ML
25 INJECTION, SOLUTION INTRAMUSCULAR; INTRAVENOUS EVERY 5 MIN PRN
Status: DISCONTINUED | OUTPATIENT
Start: 2023-12-04 | End: 2023-12-04 | Stop reason: HOSPADM

## 2023-12-04 RX ORDER — PROPOFOL 10 MG/ML
INJECTION, EMULSION INTRAVENOUS PRN
Status: DISCONTINUED | OUTPATIENT
Start: 2023-12-04 | End: 2023-12-04

## 2023-12-04 RX ORDER — HYDROMORPHONE HCL IN WATER/PF 6 MG/30 ML
0.2 PATIENT CONTROLLED ANALGESIA SYRINGE INTRAVENOUS
Status: DISCONTINUED | OUTPATIENT
Start: 2023-12-04 | End: 2023-12-05

## 2023-12-04 RX ORDER — AMLODIPINE BESYLATE 2.5 MG/1
5 TABLET ORAL DAILY
Status: DISCONTINUED | OUTPATIENT
Start: 2023-12-04 | End: 2023-12-08 | Stop reason: HOSPADM

## 2023-12-04 RX ORDER — LANOLIN ALCOHOL/MO/W.PET/CERES
1000 CREAM (GRAM) TOPICAL DAILY
Status: DISCONTINUED | OUTPATIENT
Start: 2023-12-05 | End: 2023-12-08 | Stop reason: HOSPADM

## 2023-12-04 RX ORDER — HYDRALAZINE HYDROCHLORIDE 20 MG/ML
5 INJECTION INTRAMUSCULAR; INTRAVENOUS EVERY 4 HOURS PRN
Status: DISCONTINUED | OUTPATIENT
Start: 2023-12-04 | End: 2023-12-04 | Stop reason: HOSPADM

## 2023-12-04 RX ORDER — OXYCODONE HYDROCHLORIDE 5 MG/1
5 TABLET ORAL EVERY 4 HOURS PRN
Status: DISCONTINUED | OUTPATIENT
Start: 2023-12-04 | End: 2023-12-05

## 2023-12-04 RX ORDER — DEXAMETHASONE SODIUM PHOSPHATE 4 MG/ML
INJECTION, SOLUTION INTRA-ARTICULAR; INTRALESIONAL; INTRAMUSCULAR; INTRAVENOUS; SOFT TISSUE PRN
Status: DISCONTINUED | OUTPATIENT
Start: 2023-12-04 | End: 2023-12-04

## 2023-12-04 RX ORDER — AMOXICILLIN 250 MG
1 CAPSULE ORAL
Status: DISCONTINUED | OUTPATIENT
Start: 2023-12-04 | End: 2023-12-08 | Stop reason: HOSPADM

## 2023-12-04 RX ADMIN — Medication 2 G: at 10:00

## 2023-12-04 RX ADMIN — ONDANSETRON 4 MG: 2 INJECTION INTRAMUSCULAR; INTRAVENOUS at 10:12

## 2023-12-04 RX ADMIN — ACETAMINOPHEN 650 MG: 325 TABLET, FILM COATED ORAL at 15:02

## 2023-12-04 RX ADMIN — PROPOFOL 20 MG: 10 INJECTION, EMULSION INTRAVENOUS at 10:20

## 2023-12-04 RX ADMIN — AMLODIPINE BESYLATE 5 MG: 2.5 TABLET ORAL at 17:21

## 2023-12-04 RX ADMIN — ACETAMINOPHEN 650 MG: 325 TABLET, FILM COATED ORAL at 19:21

## 2023-12-04 RX ADMIN — SODIUM CHLORIDE, POTASSIUM CHLORIDE, SODIUM LACTATE AND CALCIUM CHLORIDE: 600; 310; 30; 20 INJECTION, SOLUTION INTRAVENOUS at 09:51

## 2023-12-04 RX ADMIN — ACETAMINOPHEN 650 MG: 325 TABLET, FILM COATED ORAL at 08:04

## 2023-12-04 RX ADMIN — PROPOFOL 130 MG: 10 INJECTION, EMULSION INTRAVENOUS at 09:51

## 2023-12-04 RX ADMIN — CARVEDILOL 3.12 MG: 3.12 TABLET, FILM COATED ORAL at 20:00

## 2023-12-04 RX ADMIN — LIDOCAINE HYDROCHLORIDE 100 MG: 20 INJECTION, SOLUTION INFILTRATION; PERINEURAL at 09:51

## 2023-12-04 RX ADMIN — DEXAMETHASONE SODIUM PHOSPHATE 4 MG: 4 INJECTION, SOLUTION INTRA-ARTICULAR; INTRALESIONAL; INTRAMUSCULAR; INTRAVENOUS; SOFT TISSUE at 10:12

## 2023-12-04 RX ADMIN — HYDROMORPHONE HYDROCHLORIDE 0.2 MG: 0.2 INJECTION, SOLUTION INTRAMUSCULAR; INTRAVENOUS; SUBCUTANEOUS at 20:00

## 2023-12-04 RX ADMIN — FENTANYL CITRATE 50 MCG: 50 INJECTION INTRAMUSCULAR; INTRAVENOUS at 10:20

## 2023-12-04 RX ADMIN — HYDRALAZINE HYDROCHLORIDE 5 MG: 20 INJECTION INTRAMUSCULAR; INTRAVENOUS at 11:23

## 2023-12-04 RX ADMIN — FENTANYL CITRATE 25 MCG: 50 INJECTION INTRAMUSCULAR; INTRAVENOUS at 10:08

## 2023-12-04 RX ADMIN — HYDRALAZINE HYDROCHLORIDE 5 MG: 20 INJECTION INTRAMUSCULAR; INTRAVENOUS at 08:04

## 2023-12-04 RX ADMIN — FENTANYL CITRATE 25 MCG: 50 INJECTION INTRAMUSCULAR; INTRAVENOUS at 09:52

## 2023-12-04 ASSESSMENT — ACTIVITIES OF DAILY LIVING (ADL)
ADLS_ACUITY_SCORE: 39
ADLS_ACUITY_SCORE: 39
CHANGE_IN_FUNCTIONAL_STATUS_SINCE_ONSET_OF_CURRENT_ILLNESS/INJURY: NO
TOILETING_ISSUES: NO
ADLS_ACUITY_SCORE: 22
DIFFICULTY_COMMUNICATING: NO
FALL_HISTORY_WITHIN_LAST_SIX_MONTHS: NO
WEAR_GLASSES_OR_BLIND: NO
EQUIPMENT_CURRENTLY_USED_AT_HOME: WALKER, ROLLING
ADLS_ACUITY_SCORE: 22
ADLS_ACUITY_SCORE: 39
ADLS_ACUITY_SCORE: 35
DIFFICULTY_EATING/SWALLOWING: NO
ADLS_ACUITY_SCORE: 39
DESCRIBE_HEARING_LOSS: BILATERAL HEARING LOSS
ADLS_ACUITY_SCORE: 22
ADLS_ACUITY_SCORE: 22
HEARING_DIFFICULTY_OR_DEAF: NO
DOING_ERRANDS_INDEPENDENTLY_DIFFICULTY: NO
DRESSING/BATHING_DIFFICULTY: NO
WALKING_OR_CLIMBING_STAIRS_DIFFICULTY: NO
CONCENTRATING,_REMEMBERING_OR_MAKING_DECISIONS_DIFFICULTY: NO

## 2023-12-04 NOTE — PROVIDER NOTIFICATION
12/04/23 0725   Vitals   Pulse 65   BP (!) 201/90     Dr Faustin, [facility], notified of increased BP on arrival to pre-op. TORB received for Hydralazine 5 mg IV.

## 2023-12-04 NOTE — DISCHARGE INSTRUCTIONS
Post-operative Instructions - Enucleation and Evisceration   Ophthalmic Plastic and Reconstructive Surgery    Michel Solano M.D.      All instructions apply to the operated eye(s) or eyelid(s).    Wound care and personal care  ? Leave the dressing in place until seen in clinic. Ensure that the bandage does not get wet when you take a shower.  ? Warm soaks or warm packs should be used over the operated side four times daily after the dressing has been removed.    ? You may shower the day after surgery but do not get the dressing wet. Do not bathefor 1 week to prevent contamination of your wounds. Do not submerge your head in water (swimming, in a tub) for six weeks.  ? Do not apply make-up to the eyes or eyelids for 2 weeks after surgery.  ? Expect some swelling, bruising, black eye (even into the lower eyelids and cheeks). Also expect serum caking, crusting and discharge from the eye and/or incisions. A small amount of surface bleeding is normal for the first 48 hours.  ? Your eye(s) and eyelid(s) may be painful and tender. This is normal after surgery.  Use the pain medication as prescribed.    Follow-up  ? Return to the Eye Clinic for a follow-up appointment with your physician as  scheduled. If no appointment has been scheduled, you should be seen within 4-7 days to have the dressing removed.    Activity restrictions and driving  ? Avoid heavy lifting (over 15 pounds), bending, exercise or strenuous activity for 1 week after surgery.  You may resume other activities and return to work as tolerated.  ? You may not resume driving until have you stopped using narcotic pain medications (such as Norco, Oxycodone, Percocet, Tylenol #3).  ? Sinus Precautions for 1 week: Sneeze with mouth open. Cough with mouth open. No blowing nose. No straws. No bending over.    Medications  ? Restart all your regular home medications. If you were using eye drops in the operated eye, you can stop those. If you take Plavix or Aspirin  on a regular basis, wait for 72 hours after your surgery before restarting these in order to decrease the risk of bleeding complications.  ? Avoid aspirin and aspirin-like medications (Motrin, Aleve, Ibuprofen, Katie-  Kuna etc) for 72 hours to reduce the risk of bleeding. You may take Tylenol  (acetaminophen) for pain.  ? In addition to your home medications, take the following post-operative medications as prescribed by your physician.    ? Take scheduled extra strength Tylenol for pain.  You may take the pain pills (oxycodone) at prescribed frequency as needed for breakthrough pain.  ? Once your dressing has been removed, you should apply the prescribed antibiotic ointment to the operated eye socket three times a day.    ? WARNING: Many prescription pain medications contain Tylenol  (acetaminophen). You must not take more than 4,000 mg of acetaminophen per  24-hour period. This is equivalent to 6 tablets of Darvocet, 8 tablets of Vicodin, or  12 tablets of Norco, Percocet or Tylenol #3. If you take other over-the-counter medications containing acetaminophen, you must take the amount of acetaminophen into account and reduce the number of prescribed pain pills accordingly.  ? The prescribed medications may make you drowsy. You must not drive a car,  operate heavy machinery or drink alcohol while taking them.  ? The prescribed pain medications may cause constipation and nausea. Take them with some food to prevent a stomach upset. If you continue to experience nausea, call your physician.    Contact Information:  If you have any problems, concerns, or need to schedule follow up please call the clinic:    If your clinic appointments (regardless of where surgery was performed) are at the [facility]: [phone]     An on call person can be reached after hours for concerns. The on call doctor should not call in medication refill requests after hours or on weekends, so please plan accordingly. An effort  has been made to provide adequate pain medications following every surgery, and refills will not be provided in most instances. Narcotic pain medications cannot be called in.     Please email a few photos of your eye(s) or other operative site(s) to [email or web address] prior to your follow up visit.

## 2023-12-04 NOTE — ANESTHESIA PROCEDURE NOTES
Airway       Patient location during procedure: OR  Staff -        CRNA: Allyssa Bautista APRN CRNA       Performed By: CRNA  Consent for Airway        Urgency: elective  Indications and Patient Condition       Indications for airway management: sybil-procedural       Induction type:intravenous       Mask difficulty assessment: 1 - vent by mask    Final Airway Details       Final airway type: supraglottic airway    Supraglottic Airway Details        Type: LMA       Brand: I-Gel       LMA size: 4    Post intubation assessment        Placement verified by: capnometry, equal breath sounds and chest rise        Number of attempts at approach: 1       Secured with: tape (blue tape)       Ease of procedure: easy       Dentition: Intact and Unchanged

## 2023-12-04 NOTE — PLAN OF CARE
Goal Outcome Evaluation:           ADMISSION to [facility]:    Anna Marrero was admitted from PACU for Post Op eye surgery.  2 RN skin assessment: completed by Carlie Brock  Result of skin assessment and interventions/actions: Skin intact  Height, weight: completed? 61.6 kg  Patient belongings & admission documents: see Flowsheets, completed? Artem French RN  MDRO education added to care plan: safety checks and infection control

## 2023-12-04 NOTE — OR NURSING
"PACU to Inpatient Nursing Handoff    Patient Anna Marrero is a 89 year old female who speaks English.   Procedure Procedure(s):  Right eye evisceration with implant   Surgeon(s) Primary: Rosanne George MD  Resident - Assisting: Rebekah Crump MD     Allergies   Allergen Reactions    Cefaclor Other (See Comments) and Rash     Edema and redness    Buspirone      Other reaction(s): Unknown Reaction  dizziness    Hydrochlorothiazide      Other reaction(s): Unknown Reaction    Lisinopril      Other reaction(s): Unknown Reaction  cough    Other Drug Allergy (See Comments) Other (See Comments)     Increases BP       Isolation  [unfilled]     Past Medical History   has no past medical history on file.    Anesthesia General   Dermatome Level     Preop Meds acetaminophen (Tylenol) - time given: 0800   Nerve block Not applicable   Intraop Meds dexamethasone (Decadron)  fentanyl (Sublimaze): 100 mcg total  ondansetron (Zofran): last given at 1012  Hydralazine 5 mg at  0804   Local Meds Lidocaine, epi, marcaine   Antibiotics cefazolin (Ancef) - last given at 1000     Pain Patient Currently in Pain: denies   PACU meds  hydralazine (Apresoline): 5 mg (total dose) last given at 1125    PCA / epidural No   Capnography     Telemetry ECG Rhythm: Atrial paced rhythm   Inpatient Telemetry Monitor Ordered? No        Labs Glucose Lab Results   Component Value Date    GLC 94 12/04/2023       Hgb No results found for: \"HGB\"    INR No results found for: \"INR\"   PACU Imaging Not applicable     Wound/Incision Incision/Surgical Site 12/04/23 Right (Active)   Incision Assessment UTV 12/04/23 1245   Dressing Intervention Clean, dry, intact 12/04/23 1245   Number of days: 0      CMS        Equipment Not applicable   Other LDA       IV Access Peripheral IV Distal;Right;Posterior Lower forearm (Active)   Site Assessment WDL 12/04/23 1135   Line Status Infusing 12/04/23 1135   Dressing Transparent 12/04/23 1135   Dressing Status " clean;dry;intact 12/04/23 1135   Dressing Intervention New dressing  12/04/23 0803   Phlebitis Scale 0-->no symptoms 12/04/23 1135   Number of days:       Blood Products Not applicable EBL minimal mL   Intake/Output Date 12/04/23 0700 - 12/05/23 0659   Shift 0036-5800 9851-7263 9314-8674 24 Hour Total   INTAKE   I.V. 200   200   Shift Total(mL/kg) 200(3.25)   200(3.25)   OUTPUT   Blood 2   2   Shift Total(mL/kg) 2(0.03)   2(0.03)   Weight (kg) 61.6 61.6 61.6 61.6      Drains / Arguello     Time of void PreOp Time of Void Prior to Procedure: 0935 (12/04/23 0946)    PostOp Urine Occurrence: 1 (12/04/23 1350)    Diapered? No   Bladder Scan      mL (12/04/23 1350)  water     Vitals    B/P: 122/52  T: 97.7  F (36.5  C)    Temp src: Temporal  P:  Pulse: 65 (12/04/23 1330)          R: 15  O2:  SpO2: 95 %    O2 Device: None (Room air) (12/04/23 1215)    Oxygen Delivery: 5 LPM (12/04/23 1056)         Family/support present Son and daughter in law   Patient belongings     Patient transported on Recliner chair     DC meds/scripts (obs/outpt) Escribed to outside pharmacy   Inpatient Pain Meds Released? None yet       Special needs/considerations CLAU, wears hearing aids   Tasks needing completion None       TISHA BURCH RN  ASCOM [id number]

## 2023-12-04 NOTE — ANESTHESIA CARE TRANSFER NOTE
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    Anesthesia Type:   General     Note:    Oropharynx: oropharynx clear of all foreign objects  Level of Consciousness: awake  Oxygen Supplementation: face mask  Level of Supplemental Oxygen (L/min / FiO2): 8  Independent Airway: airway patency satisfactory and stable  Dentition: dentition unchanged  Vital Signs Stable: post-procedure vital signs reviewed and stable  Report to RN Given: handoff report given  Patient transferred to: PACU    Handoff Report: Identifed the Patient, Identified the Reponsible Provider, Reviewed the pertinent medical history, Discussed the surgical course, Reviewed Intra-OP anesthesia mangement and issues during anesthesia, Set expectations for post-procedure period and Allowed opportunity for questions and acknowledgement of understanding      Vitals:  Vitals Value Taken Time   /92 12/04/23 1100   Temp     Pulse 63 12/04/23 1101   Resp 16 12/04/23 1101   SpO2 100 % 12/04/23 1101   Vitals shown include unfiled device data.    Electronically Signed By: CINDY Timmons CRNA  December 4, 2023  11:02 AM

## 2023-12-04 NOTE — LETTER
Recipient:  [facility]  Fax: [phone]      Sender:  GURU Cortés Care Coordinator  Ph: [phone]      Date: December 8, 2023  Patient Name:  Anna Marrero  Patient YOB: 1927  Routing Message:    Referral for home care services, requesting RN (med mgmt and post-hospital assessments/check-in) and OT.  Pt resides in [facility].      The documents accompanying this notice contain confidential information belonging to the sender.  This information is intended only for the use of the individual or entity named above.  The authorized recipient of this information is prohibited from disclosing this information to any other party and is required to destroy the information after its stated need has been fulfilled, unless otherwise required by state law.    If you are not the intended recipient, you are hereby notified that any disclosure, copy, distribution or action taken in reliance on the contents of these documents is strictly prohibited.  If you have received this document in error, please return it by fax to [phone] with a note on the cover sheet explaining why you are returning it (e.g. not your patient, not your provider, etc.).  If you need further assistance, please call .  Documents may also be returned by mail to [employer], , [address], [place], [place] [address].

## 2023-12-04 NOTE — OP NOTE
PREOPERATIVE DIAGNOSIS:  Right  blind painful eye  POSTOPERATIVE DIAGNOSIS: Right blind painful eye  PROCEDURES PERFORMED:   1. Evisceration of right  with placement of a 18  mm silicone implant.   2. Temporary tarsorrhaphy, right.   SURGEON: Rosanne George MD.   ASSISTANTS: Rebekah Crump MD  ANESTHESIA: General via endotracheal tube and a retrobulbar block consisting of a 50:50 mixture of 2% lidocaine with epinephrine and 0.5% Marcaine.   COMPLICATIONS: None.   ESTIMATED BLOOD LOSS: Less than 5 mL.   SPECIMENS: Intraocular contents and cornea from left eye sent to pathology.   IMPLANTS: 18 mm silicone sphere.   HISTORY OF PRESENT ILLNESS: Anna Marrero  presented with a  history of pain and blindness in the right eye. After the risks, benefits and alternatives to the proposed procedure were explained to the patient, informed consent was obtained.   DESCRIPTION OF PROCEDURE: Anna Marrero  was brought to the operating room and placed supine on the operating table. Under general anesthesia, with an LMA, the right was identified as the correct eye for surgery. Retrobulbar block was administered. The area was  prepped and draped in the typical sterile ophthalmic fashion. A 360-degree conjunctival peritomy was performed. The limbal incision was made with the keratome and the cornea was excised with Rajan scissors, taking a rim of the sclera. Using the evisceration spoon, the intraocular contents were removed. Hemostasis was obtained with bipolar cautery. Relaxing incisions were made inferonasally and superolaterally. Posterior  relaxing incisions were made in the sclera using the keratome to create a larger scleral volume to allow the 18 mm implant to be inserted and the sclera easily closed without tension over it. The intraocular sizing spheres were used to determine the correct implant size. A 20 mm implant was chosen. The sclera was then closed with interrupted 5-0 Monocryl sutures passed in mattress  fashion. Tenons was closed with interrupted 5-0 Monocryl sutures. Conjunctiva was closed with a 6-0 plain gut suture. Erythromycin ophthalmic ointment and a conformer were placed. Temporary tarsorrhaphy of 5-0 Monocryl was placed laterally in a double-armed horizontal mattress fashion. A pressure patch was placed. The patient tolerated the procedure well, was awoken from general anesthesia and taken to recovery room in stable condition.   CHICO RICHARDSON MD

## 2023-12-04 NOTE — OR NURSING
Anna and her family would prefer she stay in hospital overnight for pain control. Dr. George contacted, waiting for admission orders.

## 2023-12-04 NOTE — LETTER
Recipient:  [facility]  Fax: [phone]      Sender:  GURU Cortés Care Coordinator  Ph: [phone]      Date: December 8, 2023  Patient Name:  Anna Marrero  Patient YOB: 1927  Routing Message:    Signed orders for home care for a patient accepted to your services and discharging today from hospital to [facility].      The documents accompanying this notice contain confidential information belonging to the sender.  This information is intended only for the use of the individual or entity named above.  The authorized recipient of this information is prohibited from disclosing this information to any other party and is required to destroy the information after its stated need has been fulfilled, unless otherwise required by state law.    If you are not the intended recipient, you are hereby notified that any disclosure, copy, distribution or action taken in reliance on the contents of these documents is strictly prohibited.  If you have received this document in error, please return it by fax to [phone] with a note on the cover sheet explaining why you are returning it (e.g. not your patient, not your provider, etc.).  If you need further assistance, please call .  Documents may also be returned by mail to [employer], , [facility] [address], [place], [place] [address].

## 2023-12-04 NOTE — H&P
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  Patient is on Coreg, amlodipine and furosemide.  Blood pressure moderate to severely high today, suspect due to uncontrolled pain.  Will continue amlodipine and carvedilol.  Patient received hydralazine IV.  Will use IV labetalol with holding parameters for severely elevated blood pressures.  Adequate pain control  Right eye pain.  9/10 on a scale when I saw the patient.  Tylenol is not adequate to control her pain.  Patient placed on oxycodone oral for moderate pain and Dilaudid 0.2 mg IV as needed for severe pain.  Ophthalmology okay with patient being on opioids for pain control.  Right eye blindness status post right eye evisceration on 12//24.  Ophthalmology okay with patient being discharged home, ophthalmology instructions regarding postop care noted.  Hypokalemia.  Due to being on diuretics.  On furosemide 40 mg daily.  Will continue KCl 10 mEq daily while patient is on diuretics.  Symmetric bilateral lower extremity pedal edema.  On furosemide 40 mg p.o. daily.  CODE: DNR/DNI  Diet/IVF: Regular diet  DVT ppx: SCDs while in bed  Disposition/Admission Status: Observation        Ingrid Barrett MD  Internal Medicine Staff Hospitalist  [facility]  Pager: [phone]       Chief Complaint:   Right eye pain         HPI:   This is a very pleasant 89-year-old female status post right eye evisceration.  Patient was blind in the right eye.  Does have history of glaucoma.  Right eye evisceration done today.  Ophthalmology is okay with discharge of the patient  home.  Blood pressure is noted to be severely elevated which is suspected to be due to pain as well as patient not taking her usual blood pressure meds today for surgery.  Denies any headaches, no any chest pain shortness of breath nausea or vomiting.  Right eye patched.  Pain is uncontrolled.  Patient has Tylenol ordered and as well as hydralazine was given for blood pressure control.  Patient had a high blood pressure of 201/90 which is now down to 116/72.  Heart rate in the 60s.         Past Medical History:   Hypertension.  Glaucoma           Past Surgical History:     Past Surgical History:   Procedure Laterality Date    CATARACT IOL, RT/LT      GLAUCOMA SURGERY                Social History:   Reviewed  Social History     Socioeconomic History    Marital status:      Spouse name: Not on file    Number of children: Not on file    Years of education: Not on file    Highest education level: Not on file   Occupational History    Not on file   Tobacco Use    Smoking status: Never    Smokeless tobacco: Never   Substance and Sexual Activity    Alcohol use: Not on file    Drug use: Not on file    Sexual activity: Not on file   Other Topics Concern    Not on file   Social History Narrative    Not on file     Social Determinants of Health     Financial Resource Strain: Not on file   Food Insecurity: Not on file   Transportation Needs: Not on file   Physical Activity: Not on file   Stress: Not on file   Social Connections: Not on file   Interpersonal Safety: Not on file   Housing Stability: Not on file            Family History:     Family History   Problem Relation Age of Onset    Glaucoma No family hx of      Reviewed         Immunizations:     Immunization History   Administered Date(s) Administered    COVID-19 Bivalent 18+ (Moderna) 12/29/2022    COVID-19 Monovalent 18+ (Moderna) 02/17/2021, 03/17/2021, 10/27/2021              Allergies:      Allergies   Allergen Reactions    Cefaclor Other (See Comments) and  Rash     Edema and redness    Buspirone      Other reaction(s): Unknown Reaction  dizziness    Hydrochlorothiazide      Other reaction(s): Unknown Reaction    Lisinopril      Other reaction(s): Unknown Reaction  cough    Other Drug Allergy (See Comments) Other (See Comments)     Increases BP            Medications:     Medications Prior to Admission   Medication Sig Dispense Refill Last Dose    acetaminophen-codeine (TYLENOL #3) 300-30 MG tablet TAKE 1 TABLET BY MOUTH EVERY 6 HOURS AS NEEDED FOR SEVERE PAIN   12/3/2023 at 1600    amLODIPine (NORVASC) 5 MG tablet Take 5 mg by mouth daily   12/3/2023 at 0800    aspirin (ASA) 81 MG chewable tablet Take 81 mg by mouth daily   12/1/2023    B Complex-C-Folic Acid (DIALYVITE) TABS Take 1 tablet by mouth daily   12/3/2023 at 0800    carvedilol (COREG) 3.125 MG tablet Take 3.125 mg by mouth 2 times daily   12/4/2023 at 0730    cholecalciferol 50 MCG (2000 UT) CAPS Take 2,000 Units by mouth   12/3/2023 at 0800    dorzolamide-timolol (COSOPT) 2-0.5 % ophthalmic solution INSTILL 1 DROP INTO RIGHT EYE TWICE A DAY       esomeprazole (NEXIUM) 40 MG DR capsule Take 20 mg by mouth   12/3/2023 at 0800    furosemide (LASIX) 40 MG tablet    12/3/2023 at 1000    ipratropium (ATROVENT) 0.06 % nasal spray SHAKE LIQUID AND INHALE 2 SPRAYS INTO EACH NOSTRIL THREE TIMES DAILY AS NEEDED   12/4/2023    potassium chloride ER (KLOR-CON M) 10 MEQ CR tablet    12/3/2023 at 1000    senna-docusate (SENOKOT-S/PERICOLACE) 8.6-50 MG tablet Take by mouth if needed.   12/3/2023 at 2000    VENTOLIN  (90 Base) MCG/ACT inhaler    12/3/2023 at 1600    vitamin B-12 (CYANOCOBALAMIN) 1000 MCG tablet    12/3/2023    atropine 1 % ophthalmic solution INSTILL 1 DROP BY OPTHALMIC ROUTE 1 TIME EVERY DAY INTO RIGHT EYE. (Patient not taking: Reported on 8/24/2022)       brimonidine (ALPHAGAN-P) 0.15 % ophthalmic solution INSTILL 1 DROP INTO THE RIGHT EYE TWO TIMES A DAY UNTIL FURTHER INSTRUCTED DISCARD 28 DAYS  "AFTER OPENING. (Patient not taking: Reported on 8/24/2022)       clotrimazole-betamethasone (LOTRISONE) 1-0.05 % external cream APPLY TO AFFECTED AREA(S) TWO TIMES A DAY       fluorometholone (FML LIQUIFILM) 0.1 % ophthalmic suspension PLACE 1 DROP IN RIGHT EYE THREE TIMES DAILY UNTIL FURTHER INSTRUCTED (Patient not taking: Reported on 8/24/2022)       latanoprost (XALATAN) 0.005 % ophthalmic solution INSTILL 1 DROP INTO RIGHT EYE AT BEDTIME       mometasone (ELOCON) 0.1 % external solution        polyvinyl alcohol (LIQUIFILM TEARS) 1.4 % ophthalmic solution 1-2 drops       prednisoLONE acetate (PRED FORTE) 1 % ophthalmic suspension 1 DROP INTO BOTH EYES FOUR TIMES A DAY (Patient not taking: Reported on 8/24/2022)       timolol maleate (TIMOPTIC) 0.5 % ophthalmic solution INSTILL 1 DROP IN RIGHT EYE TWICE DAILY       [DISCONTINUED] HYDROcodone-acetaminophen (NORCO) 5-325 MG tablet        [DISCONTINUED] oxyCODONE (OXY-IR) 5 MG capsule Take 5 mg by mouth every 4 hours as needed for severe pain   Past Week             Review of Systems:   A complete ROS was performed and is negative other than what is stated in the HPI.          Physical Exam:   BP (!) 169/72 (BP Location: Left arm)   Pulse 65   Temp 97.5  F (36.4  C) (Oral)   Resp 18   Ht 1.575 m (5' 2.01\")   Wt 61.6 kg (135 lb 12.9 oz)   SpO2 95%   BMI 24.83 kg/m       GENERAL: Alert and oriented x 3. NAD.   HEENT: Right eyes patched.    Anicteric sclera. Mucous membranes moist.   CV: RRR. S1, S2. No murmurs appreciated.   RESPIRATORY: Effort normal on room air. Lungs CTAB with no wheezing, rales, rhonchi.   GI: Abdomen soft and non distended with normoactive bowel sounds present in all quadrants. No tenderness, rebound, guarding.   NEUROLOGICAL: No focal deficits. Moves all extremities.    EXTREMITIES: 1+ peripheral edema. Intact bilateral pedal pulses.   SKIN: No jaundice. No rashes.          Data:   Last Comprehensive Metabolic Panel:  Sodium   Date Value " Ref Range Status   12/04/2023 138 135 - 145 mmol/L Final     Comment:     Reference intervals for this test were updated on 09/26/2023 to more accurately reflect our healthy population. There may be differences in the flagging of prior results with similar values performed with this method. Interpretation of those prior results can be made in the context of the updated reference intervals.      Potassium   Date Value Ref Range Status   12/04/2023 3.6 3.4 - 5.3 mmol/L Final     Chloride   Date Value Ref Range Status   12/04/2023 101 98 - 107 mmol/L Final     Carbon Dioxide (CO2)   Date Value Ref Range Status   12/04/2023 28 22 - 29 mmol/L Final     Anion Gap   Date Value Ref Range Status   12/04/2023 9 7 - 15 mmol/L Final     Glucose   Date Value Ref Range Status   12/04/2023 94 70 - 99 mg/dL Final     Urea Nitrogen   Date Value Ref Range Status   12/04/2023 15.7 8.0 - 23.0 mg/dL Final     Creatinine   Date Value Ref Range Status   12/04/2023 0.80 0.51 - 0.95 mg/dL Final     GFR Estimate   Date Value Ref Range Status   12/04/2023 67 >60 mL/min/1.73m2 Final     Calcium   Date Value Ref Range Status   12/04/2023 9.5 8.2 - 9.6 mg/dL Final       Discussed with the patient.  All questions answered.  Patient verbalized understanding and agreement with the plan.

## 2023-12-04 NOTE — ANESTHESIA POSTPROCEDURE EVALUATION
Patient: Anna Marrero    Procedure: Procedure(s):  Right eye evisceration with implant       Anesthesia Type:  General    Note:  Disposition: Admission   Postop Pain Control: Uneventful            Sign Out: Well controlled pain   PONV: No   Neuro/Psych: Uneventful            Sign Out: Acceptable/Baseline neuro status   Airway/Respiratory:    CV/Hemodynamics: Uneventful            Sign Out: Acceptable CV status; No obvious hypovolemia; No obvious fluid overload   Other NRE: NONE   DID A NON-ROUTINE EVENT OCCUR? No    Event details/Postop Comments:  Initially emotionally in PACU, but later relaxed. I recommend admission for pain control after the retrobulbar block subsides.            Last vitals:  Vitals Value Taken Time   /71 12/04/23 1245   Temp 36.5  C (97.7  F) 12/04/23 1056   Pulse 64 12/04/23 1255   Resp 7 12/04/23 1255   SpO2 96 % 12/04/23 1255   Vitals shown include unfiled device data.    Electronically Signed By: Eden Faustin MD  December 4, 2023  12:56 PM

## 2023-12-04 NOTE — LETTER
Transition Communication Hand-off for Care Transitions to Next Level of Care Provider    Name: Anna Marrero  : 1927  MRN #: [id number]  Primary Care Provider: Porfirio Garcia     Primary Clinic: [facility]  [address] [facility] [address]  [place] [facility] [address]     Reason for Hospitalization:  Primary open angle glaucoma of right eye, severe stage [H40.1113]  Blind painful eye [H54.40, H57.10]  Postoperative eye state [Z98.890]  Blind painful right eye [H54.40, H57.11]  Admit Date/Time: 2023  6:39 AM  Discharge Date: 2023  Payor Source: Payor: [facility] / Plan: [facility] MEDICARE ADVANTAGE / Product Type: Medicare /          Reason for Communication Hand-off Referral: Avoidable readmission within 30 days    Discharge Plan: Discharged to home with home care nursing and OT from [facility].    Discharge Needs Assessment:  Needs      Flowsheet Row Most Recent Value   Equipment Currently Used at Home walker, rolling, grab bar, toilet, grab bar, tub/shower, shower chair   # of Referrals Placed by  Homecare          Follow-up plan:    Future Appointments   Date Time Provider Department Center   2023 10:00 AM Leelee Garvin OTR [facility]   2023  1:15 PM Rosanne George MD [facility]       Any outstanding tests or procedures:        Referrals       Future Labs/Procedures    Home Care Referral     Comments:    Your provider has ordered home health services. If you have not been contacted within 2 days of your discharge please call the selected Home Care agency listed on your Discharge document.  If a Home Care agency is NOT listed, please call [phone].              Key Recommendations:  Recommend general PCP follow up appointment for this patient.    Scott Pacheco RN Care Coordinator    AVS/Discharge Summary is the source of truth; this is a helpful guide for improved communication of patient story

## 2023-12-04 NOTE — BRIEF OP NOTE
[facility]    Brief Operative Note    Pre-operative diagnosis: Primary open angle glaucoma of right eye, severe stage [H40.1113]  Blind painful eye [H54.40, H57.10]  Post-operative diagnosis Same as pre-operative diagnosis    Procedure: Right eye evisceration with implant, Right - Eye    Surgeon: Surgeon(s) and Role:     * Rosanne George MD - Primary     * Rebekah Crump MD - Resident - Assisting  Anesthesia: General   Estimated Blood Loss: Minimal    Drains: None  Specimens:   ID Type Source Tests Collected by Time Destination   1 : Eye, right Tissue Eye, Right SURGICAL PATHOLOGY EXAM Rosanne George MD 12/4/2023 10:22 AM      Findings:   None.  Complications: None.  Implants:   Implant Name Type Inv. Item Serial No.  Lot No. LRB No. Used Action   EYE IMP SPHERE SILICONE 18MM [id number] - [id number] Lens/Eye Implant EYE IMP SPHERE SILICONE 18MM [id number]  intermediate [employer] [id number] Right 1 Implanted

## 2023-12-04 NOTE — INTERIM SUMMARY
Brief Interval Note    Subjective: Doing well after surgery     Objective: Patch over the right eye     Assessment: Patient is a 89 yoF s/p evisceration of the right eye due to blind, painful eye 12/04/23 with adequate pain control postoperatively     Plan:  - Admit to obs   - Ok to shower the day after surgery  - Leave the dressing in place until seen in clinic. Ensure that the bandage does not get wet when showering  - Sleep with head elevated to help resolve swelling more quickly   - Avoid straining, bending at the waist, or lifting more than 15 pounds for 1 week  - Sinus Precautions for 1 week: Sneeze with mouth open. Cough with mouth open. No blowing nose. No straws. No bending over.  - Ok to stop all eyedrops including glaucoma drops in the right eye   - Ok to use opioids; pain control per primary   - Ok to discharge from ophthalmology perspective     Rebekah Crump MD  PGY-2  Department of Ophthalmology  December 4, 2023 3:17 PM

## 2023-12-04 NOTE — LETTER
Recipient:  Marizol (admissions coordinator)  Fax: [phone]      Sender:  GURU Cortés Care Coordinator  Ph: [phone]      Date: December 8, 2023  Patient Name:  Anna Marrero  Patient YOB: 1927  Routing Message:    Referral for TCU      The documents accompanying this notice contain confidential information belonging to the sender.  This information is intended only for the use of the individual or entity named above.  The authorized recipient of this information is prohibited from disclosing this information to any other party and is required to destroy the information after its stated need has been fulfilled, unless otherwise required by state law.    If you are not the intended recipient, you are hereby notified that any disclosure, copy, distribution or action taken in reliance on the contents of these documents is strictly prohibited.  If you have received this document in error, please return it by fax to [phone] with a note on the cover sheet explaining why you are returning it (e.g. not your patient, not your provider, etc.).  If you need further assistance, please call .  Documents may also be returned by mail to [employer], , [address], [place], [place] [address].

## 2023-12-05 PROBLEM — H57.11 BLIND PAINFUL RIGHT EYE: Status: ACTIVE | Noted: 2023-12-05

## 2023-12-05 PROBLEM — H54.40 BLIND PAINFUL RIGHT EYE: Status: ACTIVE | Noted: 2023-12-05

## 2023-12-05 LAB
ATRIAL RATE - MUSE: 65 BPM
DIASTOLIC BLOOD PRESSURE - MUSE: NORMAL MMHG
INTERPRETATION ECG - MUSE: NORMAL
P AXIS - MUSE: -19 DEGREES
PR INTERVAL - MUSE: 236 MS
QRS DURATION - MUSE: 92 MS
QT - MUSE: 418 MS
QTC - MUSE: 434 MS
R AXIS - MUSE: -14 DEGREES
SYSTOLIC BLOOD PRESSURE - MUSE: NORMAL MMHG
T AXIS - MUSE: 23 DEGREES
VENTRICULAR RATE- MUSE: 65 BPM

## 2023-12-05 PROCEDURE — 99232 SBSQ HOSP IP/OBS MODERATE 35: CPT | Performed by: INTERNAL MEDICINE

## 2023-12-05 PROCEDURE — 250N000013 HC RX MED GY IP 250 OP 250 PS 637: Performed by: INTERNAL MEDICINE

## 2023-12-05 PROCEDURE — 96374 THER/PROPH/DIAG INJ IV PUSH: CPT

## 2023-12-05 PROCEDURE — 120N000002 HC R&B MED SURG/OB UMMC

## 2023-12-05 PROCEDURE — 250N000011 HC RX IP 250 OP 636: Mod: JZ | Performed by: INTERNAL MEDICINE

## 2023-12-05 PROCEDURE — G0378 HOSPITAL OBSERVATION PER HR: HCPCS

## 2023-12-05 RX ORDER — ACETAMINOPHEN 325 MG/1
975 TABLET ORAL EVERY 8 HOURS
Status: DISCONTINUED | OUTPATIENT
Start: 2023-12-05 | End: 2023-12-08 | Stop reason: HOSPADM

## 2023-12-05 RX ORDER — OXYCODONE HYDROCHLORIDE 10 MG/1
10 TABLET ORAL EVERY 4 HOURS PRN
Status: DISCONTINUED | OUTPATIENT
Start: 2023-12-05 | End: 2023-12-05

## 2023-12-05 RX ORDER — ACETAMINOPHEN 325 MG/1
650 TABLET ORAL EVERY 8 HOURS PRN
Status: DISCONTINUED | OUTPATIENT
Start: 2023-12-05 | End: 2023-12-08 | Stop reason: HOSPADM

## 2023-12-05 RX ORDER — OXYCODONE HYDROCHLORIDE 5 MG/1
5 TABLET ORAL
Status: DISCONTINUED | OUTPATIENT
Start: 2023-12-05 | End: 2023-12-06

## 2023-12-05 RX ORDER — HYDROMORPHONE HYDROCHLORIDE 1 MG/ML
0.5 INJECTION, SOLUTION INTRAMUSCULAR; INTRAVENOUS; SUBCUTANEOUS EVERY 4 HOURS PRN
Status: DISCONTINUED | OUTPATIENT
Start: 2023-12-05 | End: 2023-12-06

## 2023-12-05 RX ORDER — HYDROMORPHONE HYDROCHLORIDE 1 MG/ML
0.5 INJECTION, SOLUTION INTRAMUSCULAR; INTRAVENOUS; SUBCUTANEOUS
Status: DISCONTINUED | OUTPATIENT
Start: 2023-12-05 | End: 2023-12-05

## 2023-12-05 RX ORDER — OXYCODONE HYDROCHLORIDE 5 MG/1
5 TABLET ORAL EVERY 4 HOURS PRN
Status: DISCONTINUED | OUTPATIENT
Start: 2023-12-05 | End: 2023-12-05

## 2023-12-05 RX ORDER — HYDRALAZINE HYDROCHLORIDE 20 MG/ML
10 INJECTION INTRAMUSCULAR; INTRAVENOUS EVERY 6 HOURS PRN
Status: DISCONTINUED | OUTPATIENT
Start: 2023-12-05 | End: 2023-12-08 | Stop reason: HOSPADM

## 2023-12-05 RX ORDER — OXYCODONE HYDROCHLORIDE 10 MG/1
10 TABLET ORAL
Status: DISCONTINUED | OUTPATIENT
Start: 2023-12-05 | End: 2023-12-06

## 2023-12-05 RX ORDER — METHOCARBAMOL 500 MG/1
500 TABLET, FILM COATED ORAL 4 TIMES DAILY
Status: DISCONTINUED | OUTPATIENT
Start: 2023-12-05 | End: 2023-12-08 | Stop reason: HOSPADM

## 2023-12-05 RX ADMIN — PANTOPRAZOLE SODIUM 40 MG: 40 TABLET, DELAYED RELEASE ORAL at 07:53

## 2023-12-05 RX ADMIN — METHOCARBAMOL 500 MG: 500 TABLET ORAL at 19:29

## 2023-12-05 RX ADMIN — CARVEDILOL 3.12 MG: 3.12 TABLET, FILM COATED ORAL at 07:53

## 2023-12-05 RX ADMIN — OXYCODONE HYDROCHLORIDE 10 MG: 10 TABLET ORAL at 14:09

## 2023-12-05 RX ADMIN — AMLODIPINE BESYLATE 5 MG: 2.5 TABLET ORAL at 07:53

## 2023-12-05 RX ADMIN — HYDROMORPHONE HYDROCHLORIDE 0.2 MG: 0.2 INJECTION, SOLUTION INTRAMUSCULAR; INTRAVENOUS; SUBCUTANEOUS at 11:28

## 2023-12-05 RX ADMIN — ONDANSETRON 4 MG: 2 INJECTION INTRAMUSCULAR; INTRAVENOUS at 15:53

## 2023-12-05 RX ADMIN — HYDRALAZINE HYDROCHLORIDE 10 MG: 20 INJECTION INTRAMUSCULAR; INTRAVENOUS at 14:39

## 2023-12-05 RX ADMIN — CARVEDILOL 3.12 MG: 3.12 TABLET, FILM COATED ORAL at 19:29

## 2023-12-05 RX ADMIN — OXYCODONE HYDROCHLORIDE 10 MG: 10 TABLET ORAL at 10:05

## 2023-12-05 RX ADMIN — HYDROMORPHONE HYDROCHLORIDE 0.2 MG: 0.2 INJECTION, SOLUTION INTRAMUSCULAR; INTRAVENOUS; SUBCUTANEOUS at 08:14

## 2023-12-05 RX ADMIN — HYDROMORPHONE HYDROCHLORIDE 0.2 MG: 0.2 INJECTION, SOLUTION INTRAMUSCULAR; INTRAVENOUS; SUBCUTANEOUS at 05:05

## 2023-12-05 RX ADMIN — POTASSIUM CHLORIDE 10 MEQ: 750 TABLET, EXTENDED RELEASE ORAL at 07:53

## 2023-12-05 RX ADMIN — FUROSEMIDE 40 MG: 40 TABLET ORAL at 07:54

## 2023-12-05 RX ADMIN — Medication 2000 UNITS: at 07:53

## 2023-12-05 RX ADMIN — METHOCARBAMOL 500 MG: 500 TABLET ORAL at 18:11

## 2023-12-05 RX ADMIN — ONDANSETRON 4 MG: 2 INJECTION INTRAMUSCULAR; INTRAVENOUS at 10:05

## 2023-12-05 RX ADMIN — CYANOCOBALAMIN TAB 1000 MCG 1000 MCG: 1000 TAB at 07:53

## 2023-12-05 RX ADMIN — ACETAMINOPHEN 975 MG: 325 TABLET, FILM COATED ORAL at 21:39

## 2023-12-05 ASSESSMENT — ACTIVITIES OF DAILY LIVING (ADL)
ADLS_ACUITY_SCORE: 22

## 2023-12-05 NOTE — PLAN OF CARE
"Goal Outcome Evaluation:    VS: BP (!) 184/75 (BP Location: Left arm, Patient Position: Supine)   Pulse 65   Temp 97.7  F (36.5  C) (Oral)   Resp 15   Ht 1.575 m (5' 2.01\")   Wt 61.6 kg (135 lb 12.9 oz)   SpO2 98%   BMI 24.83 kg/m      O2: 98% on RA.    Neuro: A&Ox4 with some intermittent confusion.    Bowel/Bladder: Pt independently used bathroom, x1 urine occurrence today.    LBM: 12/3/23 per pt report.    Diet: Regular. Poor appetite this AM due to pain.    Pain: Pt pain rated eye pain 7/10 to 10/10 throughout the shift. Pain being managed with PRN Dilaudid and Oxycodone.    Activity: Up with assist x1 with walker. Pt attempts to get out of bed without using call light. Bed alarm on and room door open. Pt educated on importance of using call light to prevent fall.    Dressings: Right eye covered with patch.    LDA: L PIV SL.    Equipment: Walker.    Plan: Continue POC.   Additional Info: BP has been elevated since AM. Provider notified. PRN hydralazine given.        Gerardo Carmichael RN on 12/5/2023   "

## 2023-12-05 NOTE — PROGRESS NOTES
[facility]    Medicine Progress Note - Hospitalist Service, GOLD TEAM 16    Date of Admission:  12/4/2023    Assessment & Plan   Anna Marrero is a 89-year-old female with a past medical history of hypertension, known history of glaucoma of right eye.  Patient presented to the hospital on 12/4/2023 for right eye evisceration with ophthalmology.  Postop course complicated by hypertensive urgency and postop pain requiring IV Dilaudid.  Discharge being held today given use of IV opioids for pain management.    #S/p evisceration of right eye  Ongoing pain following surgery-  -Per ophthalmology:  - Ok to shower the day after surgery  - Leave the dressing in place until seen in clinic. Ensure that the bandage does not get wet when showering  - Sleep with head elevated to help resolve swelling more quickly   - Avoid straining, bending at the waist, or lifting more than 15 pounds for 1 week  - Sinus Precautions for 1 week: Sneeze with mouth open. Cough with mouth open. No blowing nose. No straws. No bending over.  - Ok to stop all eyedrops including glaucoma drops in the right eye  - Ok to discharge from ophthalmology perspective   Continue pain management with scheduled Tylenol, as needed oxycodone.  Recommend prioritizing oral opioids     #History of hypertension  #Elevated blood pressure  -Blood pressure noted to be in the systolics 200s  -Likely combination of baseline elevated BP as well as pain  Continue pain control as above  Resume PTA amlodipine, Coreg, furosemide  Goal SBP <160       Diet: Regular Diet Adult    DVT Prophylaxis: Pneumatic Compression Devices  Arguello Catheter: Not present  Lines: None     Cardiac Monitoring: None  Code Status: No CPR- Do NOT Intubate Per discussion with family.     Clinically Significant Risk Factors Present on Admission                # Drug Induced Platelet Defect: home medication list includes an antiplatelet medication                    Disposition Plan discharge home on 12/6/2023.  Patient's family updated via phone     Expected Discharge Date: 12/06/2023        Discharge Comments: d/c home tomorrow            JEREMY RAYGOZA MD  Hospitalist Service, GOLD TEAM [address] [place] [address]  Securely message with [employer] (more info)  Text page via [facility] Paging/Directory   See signed in provider for up to date coverage information  ______________________________________________________________________    Interval History   -OR yesterday with ophthalmology  -Reports increased pain in her right retro-orbital region  -No nausea or vomiting  -Blood pressure noted to be elevated today..  She has since received IV Dilaudid which she reports has been adequate.    Physical Exam   Vital Signs: Temp: 97.7  F (36.5  C) Temp src: Oral BP: (!) 175/76 Pulse: 65   Resp: 15 SpO2: 98 % O2 Device: None (Room air)    Weight: 135 lbs 12.85 oz    General Appearance: Lying in bed, in mild distress.  Respiratory: Normal work of breathing, clear to auscultation bilaterally  Cardiovascular: Regular rate and rhythm, no murmurs rubs or gallops.  GI: NABS, soft abdomen to palpation  Skin: No visible rash  Other: S/p right restoration, right eye patch in place, no drainage seen    Medical Decision Making             Data

## 2023-12-05 NOTE — PLAN OF CARE
"Goal Outcome Evaluation:    VS: /62 (BP Location: Left arm)   Pulse 63   Temp 98.4  F (36.9  C) (Oral)   Resp 18   Ht 1.575 m (5' 2.01\")   Wt 61.6 kg (135 lb 12.9 oz)   SpO2 95%   BMI 24.83 kg/m       O2: Stable on room air >90%   Output: Voids without difficulties, bedside commode   Last BM: No BM this shift    Activity: Assist x1   Up for meals? Yes   Skin: Intact   Pain: No pain medication given between 3-7pm, reports pain level is much better than earlier.    CMS: AO x4, with intermittent confusion   Dressing: Right eye patch, post-op. CDI    Diet: Regular   LDA: RT PIV SL   Equipment: IV pole, call light, bedside commode, personal belongings   Plan: Pain management, monitor BP   Additional Info: Zofran administered for nausea  Remind pt to use call light and not get up her self.  Pt is hard of hearing, move closer when talking.       "

## 2023-12-05 NOTE — PLAN OF CARE
[phone]    Goal Outcome Evaluation:     Plan of Care Reviewed With: patient    Overall Patient Progress: no change    Outcome Evaluation: pt alert and oriented. Up with assist x1 with walker. Pt attempting to get out of bed without using the call light, bed alarm on, room door open and educated pt on the importance of using the call light and waiting for help to prevent fall and injury. Right eye covered with patch. Pain managed with prn dilaudid. Pt resting comfortably in bed overnight. Able to make needs known. Call light within reach. Continue with plan of care.

## 2023-12-05 NOTE — UTILIZATION REVIEW
Admission Status; Secondary Review Determination     Admission Date: 12/4/2023  6:39 AM       Under the authority of the Utilization Management Committee, the utilization review process indicated a secondary review on the above patient.  The review outcome is based on review of the medical records, discussions with staff, and applying clinical experience noted on the date of the review.        (x)      Inpatient Status Appropriate - This patient's medical care is consistent with medical management for inpatient care and reasonable inpatient medical practice.       RATIONALE FOR DETERMINATION      Brief clinical presentation, information copied from the chart, abbreviated and edited for relevant content:       Paged team to change to IP due to need for ongoing IV pain medications.     Anna Marrero is a 89-year-old female with a past medical history of hypertension, known history of glaucoma of right eye.  Patient presented to the hospital on 12/4/2023 for right eye evisceration with ophthalmology.  Postop course complicated by hypertensive urgency and postop pain requiring IV Dilaudid.  Discharge being held today given use of IV opioids for pain management.          At the time of admission with the information available to the attending physician, more than 2 nights hospital complex care was anticipated. Also, there was a risk of adverse outcome if patient was treated outpatient or observation. High intensity of services anticipated. Inpatient admission appropriate based on Medicare guidelines.       The information on this document is developed by the utilization review team in order for the business office to ensure compliance.  This only denotes the appropriateness of proper admission status and does not reflect the quality of care rendered.         The definitions of Inpatient Status and Observation Status used in making the determination above are those provided in the CMS Coverage Manual, Chapter 1 and  Chapter 6, section 70.4.      Sincerely,      Clover Hewitt MD   Utilization Review/ Case Management  [facility].

## 2023-12-06 PROCEDURE — 99232 SBSQ HOSP IP/OBS MODERATE 35: CPT | Performed by: INTERNAL MEDICINE

## 2023-12-06 PROCEDURE — 250N000013 HC RX MED GY IP 250 OP 250 PS 637: Performed by: INTERNAL MEDICINE

## 2023-12-06 PROCEDURE — 120N000002 HC R&B MED SURG/OB UMMC

## 2023-12-06 PROCEDURE — 250N000011 HC RX IP 250 OP 636: Performed by: INTERNAL MEDICINE

## 2023-12-06 RX ORDER — OXYCODONE HYDROCHLORIDE 10 MG/1
10 TABLET ORAL
Status: DISCONTINUED | OUTPATIENT
Start: 2023-12-06 | End: 2023-12-07

## 2023-12-06 RX ORDER — POLYETHYLENE GLYCOL 3350 17 G/17G
17 POWDER, FOR SOLUTION ORAL DAILY
Status: DISCONTINUED | OUTPATIENT
Start: 2023-12-06 | End: 2023-12-07

## 2023-12-06 RX ORDER — TIZANIDINE 2 MG/1
2 TABLET ORAL EVERY 6 HOURS PRN
Status: DISCONTINUED | OUTPATIENT
Start: 2023-12-06 | End: 2023-12-08 | Stop reason: HOSPADM

## 2023-12-06 RX ORDER — AMOXICILLIN 250 MG
2 CAPSULE ORAL 2 TIMES DAILY
Status: DISCONTINUED | OUTPATIENT
Start: 2023-12-06 | End: 2023-12-08 | Stop reason: HOSPADM

## 2023-12-06 RX ADMIN — METHOCARBAMOL 500 MG: 500 TABLET ORAL at 15:44

## 2023-12-06 RX ADMIN — CYANOCOBALAMIN TAB 1000 MCG 1000 MCG: 1000 TAB at 07:30

## 2023-12-06 RX ADMIN — DOCUSATE SODIUM AND SENNOSIDES 2 TABLET: 8.6; 5 TABLET, FILM COATED ORAL at 13:14

## 2023-12-06 RX ADMIN — CARVEDILOL 3.12 MG: 3.12 TABLET, FILM COATED ORAL at 20:17

## 2023-12-06 RX ADMIN — POTASSIUM CHLORIDE 10 MEQ: 750 TABLET, EXTENDED RELEASE ORAL at 07:30

## 2023-12-06 RX ADMIN — Medication 2000 UNITS: at 07:30

## 2023-12-06 RX ADMIN — OXYCODONE HYDROCHLORIDE 10 MG: 10 TABLET ORAL at 17:21

## 2023-12-06 RX ADMIN — METHOCARBAMOL 500 MG: 500 TABLET ORAL at 07:30

## 2023-12-06 RX ADMIN — AMLODIPINE BESYLATE 5 MG: 2.5 TABLET ORAL at 07:30

## 2023-12-06 RX ADMIN — HYDROMORPHONE HYDROCHLORIDE 0.5 MG: 1 INJECTION, SOLUTION INTRAMUSCULAR; INTRAVENOUS; SUBCUTANEOUS at 06:56

## 2023-12-06 RX ADMIN — POLYETHYLENE GLYCOL 3350 17 G: 17 POWDER, FOR SOLUTION ORAL at 13:14

## 2023-12-06 RX ADMIN — OXYCODONE HYDROCHLORIDE 10 MG: 10 TABLET ORAL at 10:54

## 2023-12-06 RX ADMIN — OXYCODONE HYDROCHLORIDE 10 MG: 10 TABLET ORAL at 21:18

## 2023-12-06 RX ADMIN — ACETAMINOPHEN 975 MG: 325 TABLET, FILM COATED ORAL at 13:14

## 2023-12-06 RX ADMIN — ACETAMINOPHEN 975 MG: 325 TABLET, FILM COATED ORAL at 21:18

## 2023-12-06 RX ADMIN — ACETAMINOPHEN 975 MG: 325 TABLET, FILM COATED ORAL at 05:35

## 2023-12-06 RX ADMIN — DOCUSATE SODIUM AND SENNOSIDES 2 TABLET: 8.6; 5 TABLET, FILM COATED ORAL at 20:17

## 2023-12-06 RX ADMIN — CARVEDILOL 3.12 MG: 3.12 TABLET, FILM COATED ORAL at 07:30

## 2023-12-06 RX ADMIN — FUROSEMIDE 40 MG: 40 TABLET ORAL at 07:30

## 2023-12-06 RX ADMIN — METHOCARBAMOL 500 MG: 500 TABLET ORAL at 20:17

## 2023-12-06 RX ADMIN — PANTOPRAZOLE SODIUM 40 MG: 40 TABLET, DELAYED RELEASE ORAL at 07:30

## 2023-12-06 RX ADMIN — METHOCARBAMOL 500 MG: 500 TABLET ORAL at 12:10

## 2023-12-06 ASSESSMENT — ACTIVITIES OF DAILY LIVING (ADL)
ADLS_ACUITY_SCORE: 22

## 2023-12-06 NOTE — PROGRESS NOTES
[facility]    Medicine Progress Note - Hospitalist Service, GOLD TEAM 16    Date of Admission:  12/4/2023    Assessment & Plan   Anna Marrero is a 89-year-old female with a past medical history of hypertension, known history of glaucoma of right eye.  Patient presented to the hospital on 12/4/2023 for right eye evisceration with ophthalmology.  Discharge being held given poor pain.     #S/p evisceration of right eye  Ongoing pain following surgery  -Per ophthalmology:  - Ok to shower the day after surgery  - Leave the dressing in place until seen in clinic. Ensure that the bandage does not get wet when showering  - Sleep with head elevated to help resolve swelling more quickly   - Avoid straining, bending at the waist, or lifting more than 15 pounds for 1 week  - Sinus Precautions for 1 week: Sneeze with mouth open. Cough with mouth open. No blowing nose. No straws. No bending over.  - Ok to stop all eyedrops including glaucoma drops in the right eye  - Ok to discharge from ophthalmology perspective   Continue pain management with scheduled Tylenol, as needed oxycodone.  Recommend prioritizing oral opioids. Added prn tizanidine.      #History of hypertension  #Elevated blood pressure, improving  -Blood pressure noted to be in the systolics 200s  -Likely combination of baseline elevated BP as well as pain  Continue pain control as above  Resume PTA amlodipine, Coreg, furosemide  Goal SBP <160       Diet: Regular Diet Adult    DVT Prophylaxis: Pneumatic Compression Devices  Arguello Catheter: Not present  Lines: None     Cardiac Monitoring: None  Code Status: No CPR- Do NOT Intubate Per discussion with family.     Clinically Significant Risk Factors                                    Disposition Plan  Discharge home tomorrow 12/7/23     Expected Discharge Date: 12/07/2023        Discharge Comments: d/c home tomorrow            JEREMY RAYGOZA MD  Hospitalist ServiceMOHAN  TEAM 16  [facility]  Securely message with [employer] (more info)  Text page via [employer] Paging/Directory   See signed in provider for up to date coverage information  ______________________________________________________________________    Interval History   -Afebrile and hemodynamically stable  -Continues to reports pain in her right retro-orbital region; typically worsened when patient goes from lying to sitting position.  -Patient reports being unable to ambulate or void, but following discussion with RN, this has not been an issue so far.  -Case discussed with ophthalmology team, typically, we should be expecting improvement in patient's high pain at this time.     Physical Exam   Vital Signs: Temp: 98.1  F (36.7  C) Temp src: Oral BP: (!) 141/62 Pulse: 60   Resp: 16 SpO2: 95 % O2 Device: None (Room air)    Weight: 135 lbs 12.85 oz    General Appearance: Lying in bed, in mild distress.  Respiratory: Normal work of breathing, clear to auscultation bilaterally  Cardiovascular: Regular rate and rhythm, no murmurs rubs or gallops.  GI: NABS, soft abdomen to palpation  Skin: No visible rash  Other: S/p right restoration, right eye patch in place, no drainage seen    Medical Decision Making             Data

## 2023-12-06 NOTE — PROGRESS NOTES
Shift [id number]:Glaucoma of the Right Eye History of Hypertension Post-op course complicated by hypertensive urgency and pain requiring IV Dilaudid    Summary:Pt pain is controlled with Tylenol  VS:       Pt A/O X 4. Afebrile. VSS. Lungs- Clear bilaterally Denies nausea, shortness of breath, and chest pain.     Output:       Bowels- + in all four quadrants. Voids spontaneously without   difficulty into  the toilet Has not voided overnight.   Uses the brief   Activity:     Hob elevated to resolve swelling of the eye  Pt up with 1 assist and uses the walker .  Bed Alarm on   Skin:   Intact skin      Pain:       Denies pain Continues to have scheduled Tylenol.   Sleeping well through the night   CMS:       CMS and Neuro's are intact. Denies numbness and tingling in all extremities.      Dressing:     Dressing over the Right Eye.      Diet:       Pt is on a Regular diet and did not eat overnight    Pt drinking sips of water   LDA:       PIV is patent in the Left hand .      Equipment:     . PCD's on BLE's. Bilateral heels are elevated off the bed.     Plan:       Pt is able to make needs known and the call light is within the pt's reach. Continue to monitor.       Additional Info:      .

## 2023-12-06 NOTE — PLAN OF CARE
"Goal Outcome Evaluation:    VS: BP (!) 162/71 (BP Location: Left arm)   Pulse 63   Temp 98.1  F (36.7  C) (Oral)   Resp 16   Ht 1.575 m (5' 2.01\")   Wt 61.6 kg (135 lb 12.9 oz)   SpO2 94%   BMI 24.83 kg/m     O2: 94% on RA.    Neuro: A&Ox4 with some intermittent confusion.    Bowel/Bladder: Voids without difficulty, bedside commode.    LBM: 12/3/23.    Diet: Regular.   Pain: Pt pain rated eye pain 8/10 to 10/10 throughout the shift. Pt given PRN Dilaudid and Oxycodone to manage pain.   Activity: Up with assist x1 with walker.    Dressings: Right eye covered with patch.    LDA: L PIV SL.    Equipment: Walker.    Plan: Pain management, monitor BP. Plan is to discharge tomorrow.   Additional Info: BPs have been high. Pt has PRN hydralazine for SBP > 180.     Gerardo Carmichael RN on 12/6/2023  "

## 2023-12-06 NOTE — PLAN OF CARE
"Goal Outcome Evaluation:    VS: BP (!) 141/62 (BP Location: Left arm)   Pulse 60   Temp 98.1  F (36.7  C) (Oral)   Resp 16   Ht 1.575 m (5' 2.01\")   Wt 61.6 kg (135 lb 12.9 oz)   SpO2 95%   BMI 24.83 kg/m       O2: Stable on room air >90%   Output: Voids without difficulties   Last BM: No BM this shift    Activity: Assist x1   Up for meals? Yes   Skin: Intact   Pain: Reports pain at 8/10 managed with PRN oxy   CMS: AO x4 with intermittent confusion   Dressing: Rt eye post-op CDI   Diet: Regular   LDA: Lt PIV SL   Equipment: IV pole, call light, bedside commode, personal belongings   Plan: Possible discharge 12/7/23 at 11am   Additional Info:        "

## 2023-12-07 LAB
PATH REPORT.COMMENTS IMP SPEC: NORMAL
PATH REPORT.FINAL DX SPEC: NORMAL
PATH REPORT.GROSS SPEC: NORMAL
PATH REPORT.MICROSCOPIC SPEC OTHER STN: NORMAL
PATH REPORT.RELEVANT HX SPEC: NORMAL
PHOTO IMAGE: NORMAL

## 2023-12-07 PROCEDURE — 99232 SBSQ HOSP IP/OBS MODERATE 35: CPT | Performed by: INTERNAL MEDICINE

## 2023-12-07 PROCEDURE — 250N000011 HC RX IP 250 OP 636: Mod: JZ | Performed by: INTERNAL MEDICINE

## 2023-12-07 PROCEDURE — 250N000013 HC RX MED GY IP 250 OP 250 PS 637: Performed by: INTERNAL MEDICINE

## 2023-12-07 PROCEDURE — 120N000002 HC R&B MED SURG/OB UMMC

## 2023-12-07 PROCEDURE — 99222 1ST HOSP IP/OBS MODERATE 55: CPT | Mod: AI | Performed by: PHYSICIAN ASSISTANT

## 2023-12-07 RX ORDER — POLYETHYLENE GLYCOL 3350 17 G/17G
17 POWDER, FOR SOLUTION ORAL DAILY
Qty: 30 EACH | Refills: 0 | Status: SHIPPED | OUTPATIENT
Start: 2023-12-07

## 2023-12-07 RX ORDER — HYDROMORPHONE HYDROCHLORIDE 2 MG/1
2-4 TABLET ORAL
Status: DISCONTINUED | OUTPATIENT
Start: 2023-12-07 | End: 2023-12-08

## 2023-12-07 RX ORDER — METHOCARBAMOL 500 MG/1
500 TABLET, FILM COATED ORAL 4 TIMES DAILY
Qty: 60 TABLET | Refills: 0 | Status: SHIPPED | OUTPATIENT
Start: 2023-12-07

## 2023-12-07 RX ORDER — AMOXICILLIN 250 MG
2 CAPSULE ORAL 2 TIMES DAILY PRN
Qty: 30 TABLET | Refills: 0 | Status: SHIPPED | OUTPATIENT
Start: 2023-12-07

## 2023-12-07 RX ORDER — PREDNISOLONE ACETATE 10 MG/ML
1 SUSPENSION/ DROPS OPHTHALMIC 4 TIMES DAILY
Qty: 10 ML | Refills: 0 | Status: SHIPPED | OUTPATIENT
Start: 2023-12-07

## 2023-12-07 RX ORDER — POLYETHYLENE GLYCOL 3350 17 G/17G
17 POWDER, FOR SOLUTION ORAL 3 TIMES DAILY
Status: DISCONTINUED | OUTPATIENT
Start: 2023-12-07 | End: 2023-12-08 | Stop reason: HOSPADM

## 2023-12-07 RX ADMIN — TIZANIDINE 2 MG: 2 TABLET ORAL at 22:10

## 2023-12-07 RX ADMIN — METHOCARBAMOL 500 MG: 500 TABLET ORAL at 20:23

## 2023-12-07 RX ADMIN — CYANOCOBALAMIN TAB 1000 MCG 1000 MCG: 1000 TAB at 07:37

## 2023-12-07 RX ADMIN — POTASSIUM CHLORIDE 10 MEQ: 750 TABLET, EXTENDED RELEASE ORAL at 07:37

## 2023-12-07 RX ADMIN — FUROSEMIDE 40 MG: 40 TABLET ORAL at 07:36

## 2023-12-07 RX ADMIN — Medication 2000 UNITS: at 07:36

## 2023-12-07 RX ADMIN — ONDANSETRON 4 MG: 2 INJECTION INTRAMUSCULAR; INTRAVENOUS at 20:28

## 2023-12-07 RX ADMIN — TIZANIDINE 2 MG: 2 TABLET ORAL at 03:07

## 2023-12-07 RX ADMIN — OXYCODONE HYDROCHLORIDE 10 MG: 10 TABLET ORAL at 03:07

## 2023-12-07 RX ADMIN — POLYETHYLENE GLYCOL 3350 17 G: 17 POWDER, FOR SOLUTION ORAL at 07:36

## 2023-12-07 RX ADMIN — CARVEDILOL 3.12 MG: 3.12 TABLET, FILM COATED ORAL at 07:37

## 2023-12-07 RX ADMIN — DOCUSATE SODIUM AND SENNOSIDES 2 TABLET: 8.6; 5 TABLET, FILM COATED ORAL at 07:36

## 2023-12-07 RX ADMIN — HYDROMORPHONE HYDROCHLORIDE 4 MG: 2 TABLET ORAL at 20:47

## 2023-12-07 RX ADMIN — ONDANSETRON 4 MG: 2 INJECTION INTRAMUSCULAR; INTRAVENOUS at 09:51

## 2023-12-07 RX ADMIN — HYDROMORPHONE HYDROCHLORIDE 4 MG: 2 TABLET ORAL at 11:22

## 2023-12-07 RX ADMIN — METHOCARBAMOL 500 MG: 500 TABLET ORAL at 16:28

## 2023-12-07 RX ADMIN — OXYCODONE HYDROCHLORIDE 10 MG: 10 TABLET ORAL at 07:53

## 2023-12-07 RX ADMIN — METHOCARBAMOL 500 MG: 500 TABLET ORAL at 07:37

## 2023-12-07 RX ADMIN — DOCUSATE SODIUM AND SENNOSIDES 1 TABLET: 8.6; 5 TABLET ORAL at 20:22

## 2023-12-07 RX ADMIN — AMLODIPINE BESYLATE 5 MG: 2.5 TABLET ORAL at 07:36

## 2023-12-07 RX ADMIN — METHOCARBAMOL 500 MG: 500 TABLET ORAL at 11:22

## 2023-12-07 RX ADMIN — CARVEDILOL 3.12 MG: 3.12 TABLET, FILM COATED ORAL at 20:23

## 2023-12-07 RX ADMIN — ACETAMINOPHEN 975 MG: 325 TABLET, FILM COATED ORAL at 22:09

## 2023-12-07 RX ADMIN — PANTOPRAZOLE SODIUM 40 MG: 40 TABLET, DELAYED RELEASE ORAL at 07:37

## 2023-12-07 ASSESSMENT — ACTIVITIES OF DAILY LIVING (ADL)
ADLS_ACUITY_SCORE: 25
ADLS_ACUITY_SCORE: 26
ADLS_ACUITY_SCORE: 30
ADLS_ACUITY_SCORE: 26
ADLS_ACUITY_SCORE: 25

## 2023-12-07 NOTE — PLAN OF CARE
[id number]    Goal Outcome Evaluation:  Plan of Care Reviewed With: patient  Overall Patient Progress: improving    Outcome Evaluation: Pt post-op right eye procedure.     Blood pressure this evening 151/71, scheduled BP med given. PRNs available if parameters met. Pain rated 6/10 at hour of sleep. PRN oxycodone and scheduled tylenol available. Plan to discharge tomorrow pending pain and blood pressure.

## 2023-12-07 NOTE — PROGRESS NOTES
[facility]    Medicine Progress Note - Hospitalist Service, GOLD TEAM 16    Date of Admission:  12/4/2023    Assessment & Plan   Anna Marrero is a 89-year-old female with a past medical history of hypertension, known history of glaucoma of right eye.  Patient presented to the hospital on 12/4/2023 for right eye evisceration with ophthalmology.  Discharge being held given poor pain. Patient was seen again by ophthalmology post-op pain and she is thought to have expected post-operative course.      #S/p evisceration of right eye  Ongoing pain following surgery  -Per ophthalmology:  - Ok to shower the day after surgery  - Leave the dressing in place until seen in clinic. Ensure that the bandage does not get wet when showering  - Sleep with head elevated to help resolve swelling more quickly   - Avoid straining, bending at the waist, or lifting more than 15 pounds for 1 week  - Sinus Precautions for 1 week: Sneeze with mouth open. Cough with mouth open. No blowing nose. No straws. No bending over.  - Ok to stop all eyedrops including glaucoma drops in the right eye  - Ok to discharge from ophthalmology perspective   Continue pain management with scheduled Tylenol, start prn po dilaudid in place of oxycodone. Prn tizanidine   Patient to follow-up with ophthalmology as scheduled.     #History of hypertension  #Elevated blood pressure, improving  -Blood pressure noted to be in the systolics 200s  -Likely combination of baseline elevated BP as well as pain  -BP controlled with home regimen which she should continue  -it is important to note that goal BP for patient's at this age is SBP > 140  Continue pain control as above  Resume PTA amlodipine, Coreg, furosemide  Goal SBP <160       Diet: Regular Diet Adult    DVT Prophylaxis: Pneumatic Compression Devices  Arguello Catheter: Not present  Lines: None     Cardiac Monitoring: None  Code Status: No CPR- Do NOT Intubate Per  discussion with family.     Clinically Significant Risk Factors                                    Disposition Plan  Discharge home tomorrow 12/8/23     Expected Discharge Date: 12/07/2023        Discharge Comments: d/c home tomorrow            JEREMY RAYGOZA MD  Hospitalist Service, GOLD TEAM [address] [place] [address]  Securely message with [employer] (more info)  Text page via [employer] Paging/Directory   See signed in provider for up to date coverage information  ______________________________________________________________________    Interval History   -Afebrile and hemodynamically stable  -Continues to reports pain in her right eye   -reports poor sleep overnight    Physical Exam   Vital Signs: Temp: 98  F (36.7  C) Temp src: Oral BP: (!) 141/76 Pulse: 69   Resp: 18 SpO2: 95 % O2 Device: None (Room air)    Weight: 135 lbs 12.85 oz    General Appearance: Lying in bed, in mild distress.  Respiratory: Normal work of breathing, clear to auscultation bilaterally  Cardiovascular: Regular rate and rhythm, no murmurs rubs or gallops.  GI: NABS, soft abdomen to palpation  Skin: No visible rash  Other: S/p right restoration, right eye patch in place.     Medical Decision Making             Data

## 2023-12-07 NOTE — PLAN OF CARE
"Goal Outcome Evaluation:    VS: BP (!) 175/94 (BP Location: Left arm)   Pulse 70   Temp 97.8  F (36.6  C) (Oral)   Resp 16   Ht 1.575 m (5' 2.01\")   Wt 61.6 kg (135 lb 12.9 oz)   SpO2 95%   BMI 24.83 kg/m     O2: 95% on RA.    Neuro: A&Ox4 with some intermittent confusion.    Bowel/Bladder: Voids without difficulty, bedside commode.    LBM: 12/3/23.    Diet: Regular.   Pain: Pt pain rated eye pain 8/10 to 10/10 throughout the shift. Pt given PRN Dilaudid and Oxycodone to manage pain.   Activity: Up with assist x1 with walker.    Dressings: Right eye covered with clear eye shield.    LDA: L PIV SL.    Equipment: Walker.    Plan: Pain management, monitor BP. Plan is to discharge today.   Additional Info: BPs have been high. Pt has PRN hydralazine for SBP > 180.     Gerardo Carmichael RN on 12/7/2023  "

## 2023-12-07 NOTE — PLAN OF CARE
"     Goal Outcome Evaluation:     VS: BP (!) 141/76 (BP Location: Right arm)   Pulse 69   Temp 98  F (36.7  C) (Oral)   Resp 18   Ht 1.575 m (5' 2.01\")   Wt 61.6 kg (135 lb 12.9 oz)   SpO2 95%   BMI 24.83 kg/m       O2: RA   Output: Continent of bowel and bladder   Last BM: 12/3   Activity: A1    Up for meals? Yes   Skin: Surgical incision: R eye   Pain: Incisional, PRN oxy given   CMS: AOx4 with intermittent confusion   Dressing: CDI   Diet: Reg   LDA: L PIV    Equipment: IV pole   Plan: Possible discharge today at 11AM   Additional Info:                   " 57

## 2023-12-07 NOTE — CONSULTS
"Pain Service Consultation Note  [facility]      Patient Name: Anna Marrero  MRN: [id number]   Age: 89 year old  Sex: female  Date: December 7, 2023                                      Reviewed: Yes    Referring Provider:  Elmo Jeong MD   Referring Service:  medicine  Reason for Consultation: \"Difficult to control post-op pain \"    Assessment/Recommendations:  Anna Marrero is a 89 year old female who has h/o hypertension, glaucoma of right eye.  Patient presented to the hospital on 12/4/2023 for right eye evisceration with ophthalmology.  Discharge being held given poor pain.     Pain service was consulted to assist with pain management.    Anna was seen with daughter at the bedside. Anna is hard of hearing so daughter helped with history and reiterate information to Anna.  Anna states pain on right eye which she feels is worse than prior to surgery.  However, she reports current pain level at 8/10, pain right after surgery was 13/10 and pain prior to surgery was 8/10.  She just received PO Dilaudid 4mg which she feels is helpful--was resting at the end of visit.   She reports perhaps more \"sick stomach\" with Dilaudid than oxycodone.  Daughter reports that Anna was \"loopy\" with oxycodone and did not feel that it was very helpful.     I discussed with Anna that opioids can help with pain short term to support physical healing from eye surgery, however side effects can be worrisome with sedation,\"loopiness\", constipation, respiratory depression, falls etc.  I would recommend using the lowest most effective dose for the shortest amount of time to minimize potential side effects.  At this point, can use PO Dilaudid but if \"stomach sickness\" is too bothersome/not managed then can return to oxycodone 10mg.  Can consider lidocaine ointment.       Plan:   While inpatient, can continue with Dilaudid 2-4mg PO Q 3 hours PRN.  (CrCL of 35.5ml/min, dilaudid will be best option " with this renal function).  If sedation/respiratory depression, reduce or hold.   If nausea or other side effects, can return to oxycodone  5-10mg PO Q 3 hours PRN.    To taper, recommend looking at last 24 hours of PO Dilaudid use and start at that dose for 1-2days then taper by 1 dose per day every 1-2days until zero/day   Consider lidocaine ointment to intact skin around right eye (if okay by ophthalmology)  Acetaminophen 975mg Q 8 hours  Bowel regimen    Thank you for the opportunity to participate in the care of Anna Vu  Pain Service will continue to follow.    Discussed with attending anesthesiologist  Primary Service Contacted with Recommendations? Yes    Marcia Gu PA-C  12/7/2023              Per [place]  review pulled from system on 12/07/23.     12/04/2023 12/04/2023 3 Oxycodone Hcl (Ir) 5 Mg Tablet 15.00 4   11/14/2023 11/14/2023 3 Oxycodone Hcl (Ir) 5 Mg Tablet 25.00 7   08/14/2023 08/14/2023 3 Hydrocodone-Acetamin 5-325 Mg 10.00 2   02/09/2023 02/09/2023 3 Acetaminophen-Cod #3 Tablet 10.00 7   02/02/2023 02/01/2023 2 Hemorrhoidal Ointment 57.00 10       Pain Medications/Prescriber: Rosanne George MD (ophthalmology)    Past Medical History:  No past medical history on file.      Family History:    Family History   Problem Relation Age of Onset    Glaucoma No family hx of        Social History:  Social History     Tobacco Use    Smoking status: Never    Smokeless tobacco: Never   Substance Use Topics    Alcohol use: Not on file         Review of Systems:  Complete ROS reviewed. Unless otherwise noted, all other systems found to be negative.        Laboratory Results:  Recent Labs   Lab Test 12/04/23  0744   BUN 15.7       Allergies:  Allergies   Allergen Reactions    Cefaclor Other (See Comments) and Rash     Edema and redness    Buspirone      Other reaction(s): Unknown Reaction  dizziness    Hydrochlorothiazide      Other reaction(s): Unknown Reaction    Lisinopril      Other reaction(s):  "Unknown Reaction  cough    Other Drug Allergy (See Comments) Other (See Comments)     Increases BP         Current Pain Related Medications:  Medications related to Pain Management (From now, onward)      Start     Dose/Rate Route Frequency Ordered Stop    12/07/23 1400  polyethylene glycol (MIRALAX) Packet 17 g         17 g Oral 3 TIMES DAILY 12/07/23 1022      12/07/23 1054  HYDROmorphone (DILAUDID) tablet 2-4 mg         2-4 mg Oral EVERY 3 HOURS PRN 12/07/23 1055      12/07/23 0000  senna-docusate (SENOKOT-S/PERICOLACE) 8.6-50 MG tablet         2 tablet Oral 2 TIMES DAILY PRN 12/07/23 0708      12/07/23 0000  methocarbamol (ROBAXIN) 500 MG tablet         500 mg Oral 4 TIMES DAILY 12/07/23 0708      12/07/23 0000  polyethylene glycol (MIRALAX) 17 g packet         17 g Oral DAILY 12/07/23 0708      12/06/23 1714  tiZANidine (ZANAFLEX) tablet 2 mg         2 mg Oral EVERY 6 HOURS PRN 12/06/23 1714      12/06/23 1300  senna-docusate (SENOKOT-S/PERICOLACE) 8.6-50 MG per tablet 2 tablet         2 tablet Oral 2 TIMES DAILY 12/06/23 1242      12/05/23 1600  methocarbamol (ROBAXIN) tablet 500 mg         500 mg Oral 4 TIMES DAILY 12/05/23 1407      12/05/23 1330  acetaminophen (TYLENOL) tablet 975 mg         975 mg Oral EVERY 8 HOURS 12/05/23 1306      12/05/23 1330  acetaminophen (TYLENOL) tablet 650 mg         650 mg Oral EVERY 8 HOURS PRN 12/05/23 1306      12/04/23 1909  senna-docusate (SENOKOT-S/PERICOLACE) 8.6-50 MG per tablet 1 tablet        Note to Pharmacy: PTA Sig:Take by mouth if needed.      1 tablet Oral AT BEDTIME PRN 12/04/23 1910      12/04/23 0000  oxyCODONE (OXY-IR) 5 MG capsule         5 mg Oral EVERY 6 HOURS PRN 12/04/23 0934                Physical Exam:  Vitals: BP (!) 175/94 (BP Location: Left arm)   Pulse 70   Temp 97.8  F (36.6  C) (Oral)   Resp 16   Ht 1.575 m (5' 2.01\")   Wt 61.6 kg (135 lb 12.9 oz)   SpO2 95%   BMI 24.83 kg/m      Physical Exam:     CONSTITUTIONAL/GENERAL APPEARANCE:  " "NAD  EYES: right eye-dressing intact  ENT/NECK: atraumatic, lips and oral mucous membranes dry  RESPIRATORY: non-labored breathing. No cough, wheeze  CV: HR within normal limits  ABDOMEN: round  MUSCULOSKELETAL/BACK/SPINE/EXTREMITIES: Moves all extremities purposefully.    NEURO: Alert and Oriented x3. Answers questions appropriately  SKIN/VASCULAR EXAM:  No jaundice, no visible rashes or lesions            Acute Inpatient Pain Service [facility]  Hours of pain coverage 24/7   Page via [employer]- Please Page the Pain Team Via [employer]: \"PAIN MANAGEMENT ACUTE INPATIENT/ [facility]\"           "

## 2023-12-07 NOTE — PLAN OF CARE
"Goal Outcome Evaluation:    VS: BP (!) 152/80 (BP Location: Left arm)   Pulse 65   Temp 98  F (36.7  C) (Oral)   Resp 16   Ht 1.575 m (5' 2.01\")   Wt 61.6 kg (135 lb 12.9 oz)   SpO2 94%   BMI 24.83 kg/m       O2: Stable on room air >90%   Output: Incontinence   Last BM: 12/3/23   Activity: Assist x1   Up for meals? Yes   Skin: Intact, clear rt eye shield (post-op)   Pain: Reports pain at 5/10   CMS: AO x4   Dressing: CDI, rt eye   Diet: Regular diet    LDA: Lt PIV SL   Equipment: IV pole, bedside commode, call light, walker gait belt   Plan:    Additional Info: Pain mgt consult done: see notes on dilaudid and oxy use       "

## 2023-12-07 NOTE — INTERIM SUMMARY
Interval Note    Anna Marrero is 89 year old F s/p evisceration right eye 12/4/23 who has had prolonged hospital course due to persistent pain.    She reports that overnight she slept well, had some itching which was controlled with PRN medication. She reports some pain but states that it is manageable with current pain regimen.    Patch was removed and patient had periorbital edema and erythema which was in an expected range given recent instrumentation. She had minimal periorbital tenderness to palpation and tissue had similar warmth to surrounding areas.     On examination of her eye, lateral tarsorrhaphy remained in place.  No purulent drainage seen on or around the eye. Lids were not tense. Able to separate her upper and lower lid easily without discomfort.  Her conformer was visualized with appropriate placement. No extrusion of implant seen.    Overall, she is following an expected post-operative course following evisceration. As she is endorsing pruritus to the eye, recommend shield over the eye over the next few days until symptoms improve. Continue to sleep with head of bed elevated. Continue previously described activity restrictions and sinus precautions until 12/11/23. Ok to place erythromycin ophthalmic ointment in or around the right orbital area for comfort.    Thank you for entrusting us with your care  Krsitin Ferrell MD, PGY3  Ophthalmology Resident  [facility]

## 2023-12-08 ENCOUNTER — TELEPHONE (OUTPATIENT)
Dept: OPHTHALMOLOGY | Facility: CLINIC | Age: 88
End: 2023-12-08

## 2023-12-08 ENCOUNTER — APPOINTMENT (OUTPATIENT)
Dept: OCCUPATIONAL THERAPY | Facility: CLINIC | Age: 88
DRG: 113 | End: 2023-12-08
Attending: INTERNAL MEDICINE
Payer: COMMERCIAL

## 2023-12-08 VITALS
HEIGHT: 62 IN | HEART RATE: 64 BPM | TEMPERATURE: 98.1 F | BODY MASS INDEX: 24.99 KG/M2 | RESPIRATION RATE: 16 BRPM | WEIGHT: 135.8 LBS | OXYGEN SATURATION: 93 % | DIASTOLIC BLOOD PRESSURE: 68 MMHG | SYSTOLIC BLOOD PRESSURE: 114 MMHG

## 2023-12-08 PROCEDURE — 250N000013 HC RX MED GY IP 250 OP 250 PS 637: Performed by: INTERNAL MEDICINE

## 2023-12-08 PROCEDURE — 99239 HOSP IP/OBS DSCHRG MGMT >30: CPT | Performed by: INTERNAL MEDICINE

## 2023-12-08 PROCEDURE — 999N000147 HC STATISTIC PT IP EVAL DEFER

## 2023-12-08 PROCEDURE — 97535 SELF CARE MNGMENT TRAINING: CPT | Mod: GO

## 2023-12-08 PROCEDURE — 99232 SBSQ HOSP IP/OBS MODERATE 35: CPT | Performed by: PHYSICIAN ASSISTANT

## 2023-12-08 PROCEDURE — 250N000011 HC RX IP 250 OP 636: Performed by: INTERNAL MEDICINE

## 2023-12-08 PROCEDURE — 97166 OT EVAL MOD COMPLEX 45 MIN: CPT | Mod: GO

## 2023-12-08 PROCEDURE — 250N000011 HC RX IP 250 OP 636: Mod: JZ | Performed by: INTERNAL MEDICINE

## 2023-12-08 PROCEDURE — 250N000009 HC RX 250: Performed by: INTERNAL MEDICINE

## 2023-12-08 RX ORDER — ERYTHROMYCIN 5 MG/G
OINTMENT OPHTHALMIC EVERY 8 HOURS SCHEDULED
Status: DISCONTINUED | OUTPATIENT
Start: 2023-12-08 | End: 2023-12-08 | Stop reason: HOSPADM

## 2023-12-08 RX ORDER — OXYCODONE HYDROCHLORIDE 5 MG/1
5 TABLET ORAL EVERY 4 HOURS PRN
Qty: 15 TABLET | Refills: 0 | Status: SHIPPED | OUTPATIENT
Start: 2023-12-08 | End: 2023-12-08

## 2023-12-08 RX ORDER — OXYCODONE HYDROCHLORIDE 10 MG/1
10 TABLET ORAL EVERY 4 HOURS PRN
Status: DISCONTINUED | OUTPATIENT
Start: 2023-12-08 | End: 2023-12-08 | Stop reason: HOSPADM

## 2023-12-08 RX ORDER — ERYTHROMYCIN 5 MG/G
OINTMENT OPHTHALMIC
Qty: 3.5 G | Refills: 1 | Status: SHIPPED | OUTPATIENT
Start: 2023-12-08

## 2023-12-08 RX ORDER — OXYCODONE HYDROCHLORIDE 5 MG/1
5 TABLET ORAL EVERY 4 HOURS PRN
Qty: 15 TABLET | Refills: 0 | Status: SHIPPED | OUTPATIENT
Start: 2023-12-08

## 2023-12-08 RX ORDER — ASPIRIN 81 MG/1
81 TABLET, CHEWABLE ORAL DAILY
Status: DISCONTINUED | OUTPATIENT
Start: 2023-12-08 | End: 2023-12-08 | Stop reason: HOSPADM

## 2023-12-08 RX ORDER — PROCHLORPERAZINE 25 MG
12.5 SUPPOSITORY, RECTAL RECTAL EVERY 12 HOURS PRN
Status: DISCONTINUED | OUTPATIENT
Start: 2023-12-08 | End: 2023-12-08 | Stop reason: HOSPADM

## 2023-12-08 RX ORDER — ACETAMINOPHEN 325 MG/1
975 TABLET ORAL EVERY 8 HOURS
Qty: 45 TABLET | Refills: 0 | Status: SHIPPED | OUTPATIENT
Start: 2023-12-08

## 2023-12-08 RX ORDER — OXYCODONE HYDROCHLORIDE 5 MG/1
5 TABLET ORAL EVERY 4 HOURS PRN
Status: DISCONTINUED | OUTPATIENT
Start: 2023-12-08 | End: 2023-12-08 | Stop reason: HOSPADM

## 2023-12-08 RX ORDER — PROCHLORPERAZINE MALEATE 5 MG
5 TABLET ORAL EVERY 6 HOURS PRN
Status: DISCONTINUED | OUTPATIENT
Start: 2023-12-08 | End: 2023-12-08 | Stop reason: HOSPADM

## 2023-12-08 RX ADMIN — ACETAMINOPHEN 975 MG: 325 TABLET, FILM COATED ORAL at 05:34

## 2023-12-08 RX ADMIN — METHOCARBAMOL 500 MG: 500 TABLET ORAL at 09:45

## 2023-12-08 RX ADMIN — ACETAMINOPHEN 975 MG: 325 TABLET, FILM COATED ORAL at 13:24

## 2023-12-08 RX ADMIN — PROCHLORPERAZINE MALEATE 5 MG: 5 TABLET ORAL at 11:35

## 2023-12-08 RX ADMIN — METHOCARBAMOL 500 MG: 500 TABLET ORAL at 15:22

## 2023-12-08 RX ADMIN — ONDANSETRON 4 MG: 2 INJECTION INTRAMUSCULAR; INTRAVENOUS at 08:13

## 2023-12-08 RX ADMIN — PROCHLORPERAZINE EDISYLATE 5 MG: 5 INJECTION INTRAMUSCULAR; INTRAVENOUS at 00:25

## 2023-12-08 RX ADMIN — CARVEDILOL 3.12 MG: 3.12 TABLET, FILM COATED ORAL at 09:46

## 2023-12-08 RX ADMIN — ASPIRIN 81 MG 81 MG: 81 TABLET ORAL at 09:50

## 2023-12-08 RX ADMIN — POLYETHYLENE GLYCOL 3350 17 G: 17 POWDER, FOR SOLUTION ORAL at 09:45

## 2023-12-08 RX ADMIN — DOCUSATE SODIUM AND SENNOSIDES 2 TABLET: 8.6; 5 TABLET, FILM COATED ORAL at 09:45

## 2023-12-08 RX ADMIN — ERYTHROMYCIN 1 G: 5 OINTMENT OPHTHALMIC at 13:24

## 2023-12-08 RX ADMIN — AMLODIPINE BESYLATE 5 MG: 2.5 TABLET ORAL at 09:46

## 2023-12-08 RX ADMIN — Medication 2000 UNITS: at 09:46

## 2023-12-08 RX ADMIN — ERYTHROMYCIN 1 G: 5 OINTMENT OPHTHALMIC at 10:36

## 2023-12-08 RX ADMIN — TIZANIDINE 2 MG: 2 TABLET ORAL at 15:30

## 2023-12-08 RX ADMIN — CYANOCOBALAMIN TAB 1000 MCG 1000 MCG: 1000 TAB at 09:47

## 2023-12-08 RX ADMIN — METHOCARBAMOL 500 MG: 500 TABLET ORAL at 11:35

## 2023-12-08 RX ADMIN — POLYETHYLENE GLYCOL 3350 17 G: 17 POWDER, FOR SOLUTION ORAL at 13:25

## 2023-12-08 RX ADMIN — OXYCODONE HYDROCHLORIDE 5 MG: 5 TABLET ORAL at 09:47

## 2023-12-08 RX ADMIN — PANTOPRAZOLE SODIUM 40 MG: 40 TABLET, DELAYED RELEASE ORAL at 09:45

## 2023-12-08 ASSESSMENT — ACTIVITIES OF DAILY LIVING (ADL)
ADLS_ACUITY_SCORE: 30

## 2023-12-08 NOTE — PLAN OF CARE
"Goal Outcome Evaluation:      Plan of Care Reviewed With: patient    Overall Patient Progress: improving    Outcome Evaluation: Pain well managed with 5 mg of oxycodone and tylenol. Nausea and vomiting decreased.    VS:       Pt A/O X 4. Intermittent confusion. Afebrile. VSS./68 (BP Location: Right arm)   Pulse 64   Temp 98.1  F (36.7  C) (Oral)   Resp 16   Ht 1.575 m (5' 2.01\")   Wt 61.6 kg (135 lb 12.9 oz)   SpO2 93%   BMI 24.83 kg/m    Lungs- clear bilaterally with both  anterior and posterior. IS encouraged. Denies nausea, shortness of breath, and chest pain.     Output:       Bowels- active in all four quadrants. Voids spontaneously without difficulty in the bathroom.      Activity:       Pt up assist of one with walker and gb.     Skin:   R eye surgical site, all other skin intact.     Pain:       Has pain in the R eye and given oxycodone and tylenol, and is tolerating well.      CMS:       CMS and Neuro's are intact. Denies numbness and tingling in all extremities.      Dressing:       R eye incisional dressing is CDI.      Diet:       Pt is on a regular diet and appetite was good this shift.       LDA:       No PIV.      Equipment:       Pt belongings in the room. Pt denies PCD's on BLE's. Bilateral heels are elevated off the bed.     Plan:       Pt is able to make needs known and the call light is within the pt's reach. Continue to monitor.       Additional Info:              DISCHARGE SUMMARY    Pt discharging to: Home  Transportation: Family is bringing pt.   AVS given and discussed: Yes, no further questions.   Medications given: Yes, discussed. No further questions.   Belongings returned: Yes, ensured all belongings packed and sent with pt. No items in security.   Comments: Escorted safely to elevators.   "

## 2023-12-08 NOTE — PLAN OF CARE
Orders received and acknowledged. Per discussion with OT, pt is mobilizing near baseline, primarily limited by decreased activity tolerance and change in vision. OT will continue to follow and address therapy needs. No acute inpatient PT needs identified. Defer discharge recommendations to OT. Will complete orders at this time. Thank you for this referral.     Praveena Epstein, PT, DPT

## 2023-12-08 NOTE — PROGRESS NOTES
12/08/23 0945   Appointment Info   Signing Clinician's Name / Credentials (OT) Leeleebianca Garvin, OTR/L   Living Environment   People in Home alone   Current Living Arrangements assisted living   Home Accessibility no concerns   Living Environment Comments Pt lives in an care home with WIS. Pt reports that elevator is ~80' from her apartment.   Self-Care   Usual Activity Tolerance good   Current Activity Tolerance good   Equipment Currently Used at Home walker, rolling;grab bar, toilet;grab bar, tub/shower;shower chair  (4WW)   Fall history within last six months no   Activity/Exercise/Self-Care Comment Pt receives A from staff with meals and cleaning. Pt ind with medication management and all ADLs. Pt ambulates with 4WW at baseline.   General Information   Onset of Illness/Injury or Date of Surgery 12/04/23   Referring Physician Dr. George   Patient/Family Therapy Goal Statement (OT) to return home   Additional Occupational Profile Info/Pertinent History of Current Problem per chart, 89-year-old female with a past medical history of hypertension, known history of glaucoma of right eye.  Patient presented to the hospital on 12/4/2023 for right eye evisceration with ophthalmology.   Existing Precautions/Restrictions no known precautions/restrictions   Limitations/Impairments hearing;visual   Left Upper Extremity (Weight-bearing Status) full weight-bearing (FWB)   Right Upper Extremity (Weight-bearing Status) full weight-bearing (FWB)   Left Lower Extremity (Weight-bearing Status) full weight-bearing (FWB)   Right Lower Extremity (Weight-bearing Status) full weight-bearing (FWB)   Cognitive Status Examination   Orientation Status orientation to person, place and time   Affect/Mental Status (Cognitive) WFL   Follows Commands WFL   Visual Perception   Visual Impairment/Limitations other (see comments)   Impact of Vision Impairment on Function (Vision) no vision in R eye and impaired vision in L eye   Sensory   Sensory  Quick Adds sensation intact   Pain Assessment   Patient Currently in Pain Yes, see Vital Sign flowsheet   Posture   Posture forward head position   Range of Motion Comprehensive   General Range of Motion no range of motion deficits identified   Strength Comprehensive (MMT)   Comment, General Manual Muscle Testing (MMT) Assessment generalized weakness   Muscle Tone Assessment   Muscle Tone Quick Adds No deficits were identified   Coordination   Upper Extremity Coordination No deficits were identified   Bed Mobility   Bed Mobility supine-sit;sit-supine   Supine-Sit [place] (Bed Mobility) modified independence   Sit-Supine [place] (Bed Mobility) modified independence   Transfers   Transfers bed-chair transfer;sit-stand transfer;toilet transfer   Transfer Skill: Bed to Chair/Chair to Bed   Bed-Chair [place] (Transfers) supervision   Sit-Stand Transfer   Sit-Stand [place] (Transfers) supervision   Toilet Transfer   [place] Level (Toilet Transfer) supervision   Balance   Balance Assessment no deficits identified   Activities of Daily Living   BADL Assessment/Intervention lower body dressing;toileting   Lower Body Dressing Assessment/Training   [place] Level (Lower Body Dressing) modified independence   Toileting   [place] Level (Toileting) modified independence   Clinical Impression   Criteria for Skilled Therapeutic Interventions Met (OT) Yes, treatment indicated   OT Diagnosis decreased ADL ind   Influenced by the following impairments eye surgery   OT Problem List-Impairments impacting ADL problems related to;vision;pain;activity tolerance impaired   Assessment of Occupational Performance 3-5 Performance Deficits   Identified Performance Deficits dressing, toileting, func mob   Planned Therapy Interventions (OT) ADL retraining   Clinical Decision Making Complexity (OT) detailed assessment/moderate complexity   Risk & Benefits of therapy have been explained evaluation/treatment results  reviewed;patient;son;daughter   OT Total Evaluation Time   OT Eval, Moderate Complexity Minutes (90586) 10   OT Goals   Therapy Frequency (OT) 5 times/week   OT Predicted Duration/Target Date for Goal Attainment 12/15/23   OT Goals Lower Body Dressing;Bed Mobility;Transfers;Toilet Transfer/Toileting;OT Goal 1   OT: Lower Body Dressing Modified independent;Goal Met;Completed   OT: Bed Mobility Modified independent;Goal Met;Completed   OT: Transfer Modified independent;with assistive device;Goal Met;Completed   OT: Toilet Transfer/Toileting Modified independent;using adaptive equipment;Goal Met;Completed   OT: Goal 1 Pt will ambulate Mod I for 80' to simulate distance to elevated at [facility].   Interventions   Interventions Quick Adds Self-Care/Home Management   Self-Care/Home Management   Self-Care/Home Mgmt/ADL, Compensatory, Meal Prep Minutes (79630) 23   Symptoms Noted During/After Treatment (Meal Preparation/Planning Training) fatigue   Treatment Detail/Skilled Intervention Pt supine in bed upon OT arrival and agreeable to therapy. Educ on role of OT. Pt Mod I for supine<>sit and scooting to HOB bed mob. Pt Mod I for STS from EOB and toilet. Pt Mod I for toilet and bed transfers. Pt Mod I for toileting and standing g/h at sink to wash ahnds. Pt SBA for ambualtion ~60' in dawkins with FWW. Pt reports fatigue and SOB. Educ on EC/WS and adaptive visual tech. Pt left in chair with needs in reach, pain MD present and family present.   OT Discharge Planning   OT Plan one more session for ambulation goal   OT Discharge Recommendation (DC Rec) home with home care occupational therapy   OT Rationale for DC Rec Pt near ADL baseline and would benefit from home OT for safety and adaptive techniques, equipment and technology as needed for visual impairments.   OT Brief overview of current status Mod I-SBA   Total Session Time   Timed Code Treatment Minutes 23   Total Session Time (sum of timed and untimed services) 33

## 2023-12-08 NOTE — PROGRESS NOTES
Medicine cross cover note  -I was informed that the patient is having nausea and spite of ondansetron.  I reviewed the chart and verified that the patient is on narcotics that is likely the etiology for the patient's nausea.  I added Compazine as a second line treatment for nausea.

## 2023-12-08 NOTE — TELEPHONE ENCOUNTER
Returned phone call to patient's daughter and got her scheduled to see Dr George on 12/08/2023 for post op concerns.     PRO APPIAH, 10:14 AM 12/08/2023

## 2023-12-08 NOTE — PLAN OF CARE
Goal Outcome Evaluation:      Plan of Care Reviewed With: patient, child, other (see comments) (family)    Overall Patient Progress: no change    Outcome Evaluation: Discharge Planning: Patient will discharge back to [facility] w/ home care RN and OT provided by [facility] (affiliated w/ pt's [facility])        [facility] Pacheco [facility]  Units 8M/S & 10 ICU  Pager: [phone]  Phone: [phone]

## 2023-12-08 NOTE — TELEPHONE ENCOUNTER
[facility] Call Center    Phone Message    May a detailed message be left on voicemail: yes     Reason for Call: Other: Pt daughter called and states that her mom appt w/  that was on 12/13 was recently cancelled and she is not sure why. States that she thought that was an appt they were to keep. Please review and contact pt daughter at [phone] to discuss. Thank you      Action Taken: Other: eye    Travel Screening: Not Applicable

## 2023-12-08 NOTE — CONSULTS
Care Management Initial Consult / Discharge Note    General Information  Assessment completed with: Patient, Family,    Type of CM/SW Visit: Initial Assessment    Primary Care Provider verified and updated as needed: Yes   Readmission within the last 30 days: no previous admission in last 30 days      Reason for Consult: discharge planning  Advance Care Planning: Advance Care Planning Reviewed: no concerns identified          Communication Assessment  Patient's communication style: spoken language (English or Bilingual)    Hearing Difficulty or Deaf: no   Wear Glasses or Blind: no    Cognitive  Cognitive/Neuro/Behavioral: WDL  Level of Consciousness: intermittent confusion  Arousal Level: opens eyes spontaneously  Orientation: oriented x 4  Mood/Behavior: calm, cooperative  Best Language: 0 - No aphasia  Speech: clear, logical    Living Environment:   People in home: alone     Current living Arrangements: assisted living (receives 1 meal/day and housekeeping)  Name of Facility: [facility]   Able to return to prior arrangements: yes       Family/Social Support:  Care provided by: self  Provides care for: no one     Children          Description of Support System: Supportive, Involved         Current Resources:   Patient receiving home care services: No     Community Resources: None  Equipment currently used at home: walker, rolling, grab bar, toilet, grab bar, tub/shower, shower chair  Supplies currently used at home: Incontinence Supplies    Employment/Financial:  Employment Status: retired        Financial Concerns: other (see comments) (no concerns reported, but Kimi expressed that pt's SSI is roughly 250/mo, so private paying for SOBEIDA cuts into pt's savings)           Does the patient's insurance plan have a 3 day qualifying hospital stay waiver?  No    Lifestyle & Psychosocial Needs:  Social Determinants of Health     Food Insecurity: Not on file   Depression: Not on file   Housing Stability: Not on file    Tobacco Use: Low Risk  (12/5/2023)    Patient History     Smoking Tobacco Use: Never     Smokeless Tobacco Use: Never     Passive Exposure: Not on file   Financial Resource Strain: Not on file   Alcohol Use: Not on file   Transportation Needs: Not on file   Physical Activity: Not on file   Interpersonal Safety: Not on file   Stress: Not on file   Social Connections: Not on file       Functional Status:  Prior to admission patient needed assistance:   Dependent ADLs:: Ambulation-walker  Dependent IADLs:: Cleaning, Meal Preparation, Money Management, Transportation (gets housekeeping and 1 meal/day from [facility], otherwise is independent; money mgmt from daughter Kimi)       Mental Health Status:  Mental Health Status: No Current Concerns       Chemical Dependency Status:  Chemical Dependency Status: No Current Concerns             Values/Beliefs:  Spiritual, Cultural Beliefs, Amish Practices, Values that affect care: no               Additional Information:    DISCHARGE ADDRESS: [address], [address], [place] [address] ([facility])  Address on facesheet is pt's daughter's address, as she has taken over managing mail and bills for pt.        Provider updated this writer that PT/OT have been consulted, per pt/family request.  JI today.  Up to this point, it was anticipated that pt would be discharging to home w/ no needs from care mgmt.    OT informed this writer that pt is moving too well for TCU, but would benefit from home OT for visual deficit.  OT stated pt's family has concerns about pt's medications being managed.  PT recs still TBD at this time.    Provider w/ pain team updated care mgmt that pt is okay to discharge to home.    Met w/ pt, Porfirio (son-in-law), and Kimi (daughter) at bedside.  Kimi called Jose Manuel (pt's son) and put him on speaker phone.  Introduced self and role, conducted CMA, and discussed discharge planning/recommendations.  Of note, while pt seemed to be listening during  "conversation, she also fell asleep briefly and then took a phone call, so the bulk of conversation occurred w/ family, as they are very involved in her life.    Explained what OT stated regarding pt's inability to qualify for TCU from therapy standpoint, and that they are recommending home care.  Attempted to provide education on home care services, however Jose Manuel interrupted and explained he spoke w/ Marizol at the rehab center attached to pt's [facility], and that she said the hospital SW should send a referral to them.  He stated he believes pt needs \"24/7 monitoring\", given her condition, regardless of what hospital team is recommending for discharge.  Explained referral process for TCU, explained that insurance may not approve, or that the facility may not be willing to accept the pt based on their review of clinical information.  Explained that pt may be accepted to TCU w/o insurance coverage if she is willing to private pay for services.  Jose Manuel again reiterated that family does not think pt should go home, Kimi provided additional input that they are concerned given pt's pain medication regimen, vision issues, and balance issues.  Jose Manuel explained his understanding of Medicare insurance coverage and stated that the provider needs to say they approve of a rehab stay, which would then qualify pt for TCU.  Attempted to provide education regarding BCBS Medicare Advantage requiring Evicore Auth that is reviewed by physician separate from hospital.  Attempted to explain that the physician reviewing for Evicore may decline the request.  Jose Manuel expressed various parties had suggested pt would be unsafe to go home, and that insurance should cover TCU so long as hospitalist signs off on it.  Kimi expressed that her understanding from OT session was that pt shouldn't be going home.  Informed them that this writer will attempt to get pt into TCU w/ insurance coverage, but that it cannot be guaranteed, and private pay would be " required if insurance declines.  Call was ended around this time.  Re-educated those present in room on TCU referral process and emphasized that the hospital team is recommending home w/ home care, so it is unlikely pt will qualify for TCU.  Collected CMA info from Kimi.  Regarding private pay TCU, Kimi stated she needs to think about it and cannot make a decision at this time (Kimi assists in managing pt's finances).    Updated provider to above encounter.  Explained that this writer will attempt to get pt into TCU today, but that it seems unlikely.    11:50am - Called Marizol (admission coordinator for SNF portion of [facility]) at [phone] (ext [phone]), discussed TCU.  She explained that when she spoke karuna/ Jose Manuel, she explained the referral process, and Jose Manuel was frustrated, asking what he is meant to do if they won't accept her for TCU.  She stated she will keep an eye out for a referral from this writer and have the Director of Nursing review it.  She anticipates it being reviewed today, as she does not think they have a busy day today.  Hospital care mgmt team to conduct BCBS Evicore auth if TCU is willing to accept pt.  Marizol also stated that the [facility] has  available to assist pt/family post-discharge, if they discharge to home before TCU is arranged.    Sent referral to Marizol via Epic comms tab.    In anticipation that TCU is not a feasible option: Sent referral to [facility] for home care, specified discharge address.    12:19pm - Received call from Marizol, she stated the DoN reviewed pt and stated they do not have reason to decline or accept, so they advised this writer to seek insurance auth.  Per Marizol, alternative option is private pay, no down payment required, daily rate ranging between $300-$600 depending upon pt need.  Marizol provided the following contact number for the [facility] portion of [facility]: [phone].    12:29pm - Called [facility], spoke karuna/ [facility], provided requested  info over the phone for authorization process.  [facility] was unable to provide determination at end of call, however she stated a physician was already accessing the chart to review, and that a determination should be faxed to unit 8M/S soon.  [facility] Reference Number: [id number]    1:06pm - Received Malaikaicore denial via fax.  Appeal can be submitted by sending additional records that demonstrate need for SNF stay, or by [id number] (Medical Director can be reached by calling [phone]).    1:07pm - Sent message to provider via [employer], requested a call back to discuss case.    1:30pm - Called [facility] at [phone], this was not [facility] line, but rather the home care line.  Spoke w/ Alyssia in intake.  Alyssia explained they service the [facility] but residents are free to have other home care agencies service them if they would prefer.  Explained situation and need for RN (meds mgmt, post-hospital assessments) and OT (PT has documented no PT needs).  She confirmed that their OT team is qualified to work w/ pt on adaptive therapies related to her s/p R eye evisceration.  She stated the only issue is they cannot start seeing pt until Tuesday or Wednesday of next week.  This writer opted to accept their services, explained that this writer will keep them updated regarding discharge status and if the delay is approved by provider.  She requested referral docs be faxed to: [phone].    Sent referral to [facility] via [employer] tab.    1:49pm - Paged provider, received prompt call back.  Updated provider to accepting home care agency w/ delayed SOC, that insurance declined pt for TCU, and inquired if he is interested in [id number] for appeal.  Provider stated pt is cleared to go home.  Requested provider's presence when this writer goes to meet w/ pt/family to explain options for discharge, provider agreeable.    Bedside RN updated this writer to the preferred pharmacy for discharge meds: [facility]  [facility]armacy at [address] [place] [address].    Received update from Norma at [facility], she stated [facility], but have same SOC of Tues/Wed next week.  Informed her that this writer accepted services from [facility], advised her to check w/ [facility] and [facility] to see if either agency can accept sooner.    2:20pm - Provider met w/ this writer, discussed case and discharge recs, and updated provider to preferred pharmacy.  Went to pt room w/ provider, spoke w/ pt, Kimi, Amy (another family member), and Porfirio.  Provider explained discharge recommendation from OT team.  This writer informed them of insurance denial, provided form as proof.  Informed them of private pay option v home w/ home care.  Pt stated she would rather go home than pay OOP for services.  They opted to keep [facility] as the home care provider, even if an alternative agency could see pt sooner.  Kimi expressed that she respectfully disagrees w/ the evaluation/discharge plan, as she feels pt will need more help.  Provider and this writer discussed family checking in on pt and/or staying w/ her as an option, as well as increasing SOBE[employer] services or hiring an external care provider.  Kimi stated they can look into hiring services for pt.  She stated she is well aware of what [facility] can provide.  Attempted to offer resources for [place] [employer] and [facility] [employer], Kimi stated she is well-aware of those entities already, and that pt has had [place] [employer] provide their assistance previously.  Pt ambulated to bathroom w/ walker, was no longer involved in conversation at this time.  Provider exited room to complete discharge.  Informed family that this writer will ensure all necessary orders are sent to [facility].  Discussed IMM w/ family, explained that pt had previously signed and declined to receive a copy.  Kimi expressed that pt is  hard of hearing and cannot easily read forms, and likely did not fully understand the form, so she was concerned about pt signing it.  This writer explained that signing is not agreement/disagreement, but rather an acknowledgement that someone has been informed of their right to appeal discharge.  They stated they do not wish to appeal the discharge, as it would not change anything for post-discharge, and they agree that pt is ready to discharge, they just don't fully agree w/ the evaluation that she does not qualify for TCU.  They declined to sign or receive a copy.  They inquired who would wrap things up for discharge, this writer explained that the bedside RN would do so.  They had no further questions/concerns for this writer.    Received VM from Norma at [facility], she stated another agency is considering pt, requested call back.  Called her back, informed her that pt/family want to stick w/ [facility].    2:50pm - Called Marizol left  informing her of Evicore denial and pt/family opting to return home w/ home care.    Faxed IMM to HIM, placed in chart at nurses' station.    3:21pm - Called [facility], left VM informing them of discharge and that their services will be needed.  Requested call back in the event signed orders do not arrive via fax.    Home care order entered for provider signature.    Orders sent to [facility] via [employer] tab.      Care Management Discharge Note    Discharge Date: 12/08/2023       Discharge Disposition: Home Care ([facility] for RN and OT)    Discharge Services: None    Discharge DME:  (No new DME anticipated)    Discharge Transportation: family or friend will provide    Private pay costs discussed:  Private pay TCU ($300-$600 per day)    Does the patient's insurance plan have a 3 day qualifying hospital stay waiver?  No    PAS Confirmation Code: N/A  Patient/family educated on Medicare website which has current facility and service  quality ratings: no (preference of TCU and Home Care agency was [facility])    Education Provided on the Discharge Plan: Yes  Persons Notified of Discharge Plans: pt; family; provider; [facility]  Patient/Family in Agreement with the Plan: yes    Handoff Referral Completed: Yes - external    Additional Information:  Patient is discharging to [facility] at: [address], [address], [place] [address]    [facility] will provide the following services:  - Skilled Nursing (med setup/mgmt, post-hospital assessments)  - OT    At this time, there are no further discernible needs requiring care coordination team involvement.  Please reach out to RNCC/SW if needs arise prior to discharge.          Care mgmt available as needed.    TESSA Welsh  [facility]  Units 8M/S & 10 ICU  Pager: [phone]  Phone: [phone]

## 2023-12-08 NOTE — CARE PLAN
Pt 's son Bill called concerned with discharge plan, etc. Son states he doesn't understand why his mother isn't on her usual dose of baby Asa, (explained that it was most likely held for her surgery) He feels that his mother should go to a facility to help her manage her pain, left message for SW to address, and he stated his sister would be down at 8 am to speak with the provider. Updated bedside nurse.

## 2023-12-08 NOTE — PROGRESS NOTES
"Pain Service Progress Note  [facility]  Date: 12/08/2023       Patient Name: Anna Marrero  MRN: [id number]  Age: 89 year old  Sex: female      Assessment:  Anna Marrero is a 89 year old female who has h/o hypertension, glaucoma of right eye.  Patient presented to the hospital on 12/4/2023 for right eye evisceration with ophthalmology.  Discharge being held given poor pain.      Pain service was consulted to assist with pain management.    Anna was seen with daughter and son in law at the bedside today. She was working with OT and appeared to have tolerated therapy with sitting up in chair and walking in hallways so no further rehab is recommended.      Bettery states she feels better today.  She is awake, alert and conversant.  No signs of discomfort during the visit.  Pain on right eye is rated at 6/10 but states that it is still not where she wants pain level to be.  Please note that pain PTA was 8/10.  Pain right after surgery was 13/10. Pain yesterday was 8/10.   She was taking Dilaudid 4mg PO x 2 doses from 1122am on 12/7/23  to 0947 on 12/8/23.  She had pain relief with Dilaudid but was experiencing nausea/\"stomach sickness\".   This morning she was switched back to oxycodone 5mg with no nausea.  Reports decreased in appetite- reports consuming some toast and yogurt for breakfast.     Discussed with Anna and family the nature of acute on chronic pain as it waxes and wanes, however it appears that she is slowly improving from the surgery and pain.       Plan/Recommendations:  Oxycodone 5mg PO Q 4 hours PRN.   Upon discharge, allow oxycodone 5mg PO Q 4 hours PRN.  Hold or reduce if experiencing side effects.     To taper, would decrease by 1 dose/day every 1-2days until off.  This is as needed use, so can stop sooner if not needing it.   Acetaminophen 975mg Q 8 hours  Bowel regimen      Pain Service will sign off.    Discussed with attending anesthesiologist    Marcia HASSAN " "NICK Gu  12/08/2023         Diet: Regular Diet Adult    Relevant Labs:  Recent Labs   Lab Test 12/04/23  0744   BUN 15.7       Physical Exam:  Vitals: /68 (BP Location: Right arm)   Pulse 64   Temp 98.1  F (36.7  C) (Oral)   Resp 16   Ht 1.575 m (5' 2.01\")   Wt 61.6 kg (135 lb 12.9 oz)   SpO2 93%   BMI 24.83 kg/m      Physical Exam:   CONSTITUTIONAL/GENERAL APPEARANCE: Conversant.  EYES: EOMI, sclerae clear  ENT/NECK: neck is supple  RESPIRATORY:non labored breathing, on room air  CARDIOVASCULAR: HR within normal limits  MUSCULOSKELETAL/BACK/SPINE/EXTREMITIES: Moving UE and LE independently.     NEURO:  AAOx3.   SKIN/VASCULAR EXAM:  Dry and warm.  PSYCHIATRIC/BEHAVIORAL/OBSERVATIONS:  No objective signs of pain observed during our interview.   Judgment/Insight -fair   Orientation - x3   Memory -fair   Mood and affect - calm, pleasant, cooperative         Relevant Medications:  Current Pain Medications:  Medications related to Pain Management (From now, onward)      Start     Dose/Rate Route Frequency Ordered Stop    12/08/23 0900  aspirin (ASA) chewable tablet 81 mg        Note to Pharmacy: PTA Sig:Take 81 mg by mouth daily      81 mg Oral DAILY 12/08/23 0858      12/08/23 0856  oxyCODONE IR (ROXICODONE) tablet 10 mg        See Hyperspace for full Linked Orders Report.    10 mg Oral EVERY 4 HOURS PRN 12/08/23 0856      12/08/23 0856  oxyCODONE (ROXICODONE) tablet 5 mg        See Hyperspace for full Linked Orders Report.    5 mg Oral EVERY 4 HOURS PRN 12/08/23 0856      12/07/23 1400  polyethylene glycol (MIRALAX) Packet 17 g         17 g Oral 3 TIMES DAILY 12/07/23 1022      12/07/23 0000  senna-docusate (SENOKOT-S/PERICOLACE) 8.6-50 MG tablet         2 tablet Oral 2 TIMES DAILY PRN 12/07/23 0708      12/07/23 0000  methocarbamol (ROBAXIN) 500 MG tablet         500 mg Oral 4 TIMES DAILY 12/07/23 0708      12/07/23 0000  polyethylene glycol (MIRALAX) 17 g packet         17 g Oral DAILY 12/07/23 0708 " "     12/06/23 1714  tiZANidine (ZANAFLEX) tablet 2 mg         2 mg Oral EVERY 6 HOURS PRN 12/06/23 1714      12/06/23 1300  senna-docusate (SENOKOT-S/PERICOLACE) 8.6-50 MG per tablet 2 tablet         2 tablet Oral 2 TIMES DAILY 12/06/23 1242      12/05/23 1600  methocarbamol (ROBAXIN) tablet 500 mg         500 mg Oral 4 TIMES DAILY 12/05/23 1407      12/05/23 1330  acetaminophen (TYLENOL) tablet 975 mg         975 mg Oral EVERY 8 HOURS 12/05/23 1306      12/05/23 1330  acetaminophen (TYLENOL) tablet 650 mg         650 mg Oral EVERY 8 HOURS PRN 12/05/23 1306      12/04/23 1909  senna-docusate (SENOKOT-S/PERICOLACE) 8.6-50 MG per tablet 1 tablet        Note to Pharmacy: PTA Sig:Take by mouth if needed.      1 tablet Oral AT BEDTIME PRN 12/04/23 1910      12/04/23 0000  oxyCODONE (OXY-IR) 5 MG capsule         5 mg Oral EVERY 6 HOURS PRN 12/04/23 0934              Primary Service Contacted with Recommendations? Yes            Acute Inpatient Pain Service [employer]  Hours of pain coverage 24/7   Page via Amcom- Please Page the Pain Team Via Amcom: \"PAIN MANAGEMENT ACUTE INPATIENT/ [facility]\"            "

## 2023-12-08 NOTE — PROGRESS NOTES
Shift 0249-6711:Glaucoma of the Right Eye History of Hypertension Post-op course complicated by hypertensive urgency and pain requiring IV Dilaudid    Summary:Pt pain is controlled with Tylenol and Dilaudid  VS:       Pt A/O X 4. Afebrile. VSS. Lungs- Clear bilaterally Denies being SOB or chest pain Complains of being nauseated but had no vomiting.Pt had Zofran and had Compazine x1     Output:       Bowels- + in all four quadrants. Voids spontaneously without   difficulty into the toilet Pt walks with the walker with 1 assistance   Uses the brief but not incontinent   Activity:     Hob elevated to resolve swelling of the eye  Pt up with 1 assist and uses the walker .  Bed Alarm on   Skin:   Intact skin      Pain:     Pt was given Dilaudid x1 for eye pain and scheduled Tylenol States that the pain has improved Pt awake a lot until 0400 because of nausea     CMS:       CMS and Neuro's are intact. Denies numbness and tingling in all extremities.      Dressing:     Clear shield over the right ey e.      Diet:       Pt is on a Regular diet and did not eat overnight    Pt drinking sips of water   LDA:       PIV is patent in the Left hand .      Equipment:     . PCD's on BLE's. Bilateral heels are elevated off the bed.  Bed Alarm on   Plan:       Pt is able to make needs known and the call light is within the pt's reach. Continue to monitor.       Additional Info:      .

## 2023-12-08 NOTE — DISCHARGE SUMMARY
[facility]  Hospitalist Discharge Summary      Date of Admission:  12/4/2023  Date of Discharge:  12/8/2023  Discharging Provider: JEREMY RAYGOZA MD  Discharge Service: Hospitalist Service, GOLD TEAM 16    Discharge Diagnoses   #S/p evisceration of right eye   #History of hypertension  #Elevated blood pressure, improving    Clinically Significant Risk Factors          Follow-ups Needed After Discharge   Follow-up Appointments     Adult [facility] Follow-up and recommended labs and tests      Follow up with primary care provider, Porfirio Garcia, within 7 days for   hospital follow- up.  No follow up labs or test are needed.    Patient will need to follow-up with ophthalmology       Appointments on [place] and/or [facility] (with [facility] or [facility]   provider or service). Call [phone] if you haven't heard regarding   these appointments within 7 days of discharge.              Discharge Disposition   Discharged to home  Condition at discharge: Good    Hospital Course   Anna Marrero is a 89-year-old female with a past medical history of hypertension, known history of glaucoma of right eye.  Patient presented to the hospital on 12/4/2023 for right eye evisceration with ophthalmology.  Discharge being held given poor pain. Patient was seen again by ophthalmology post-op pain and she is thought to have expected post-operative course.       #S/p evisceration of right eye  Ongoing pain following surgery  -Per ophthalmology:  - Ok to shower the day after surgery  - Leave the dressing in place until seen in clinic. Ensure that the bandage does not get wet when showering  - Sleep with head elevated to help resolve swelling more quickly   - Avoid straining, bending at the waist, or lifting more than 15 pounds for 1 week  - Sinus Precautions for 1 week: Sneeze with mouth open. Cough with mouth open. No blowing nose. No straws. No bending over.  - Ok to stop all eyedrops including  glaucoma drops in the right eye  - Ok to discharge from ophthalmology perspective   Continue pain management with scheduled Tylenol, oxycodone prn, prn robaxin   Patient to follow-up with ophthalmology as scheduled.        #History of hypertension  #Elevated blood pressure, improving  -Blood pressure noted to be in the systolics 200s  -Likely combination of baseline elevated BP as well as pain  -BP controlled with home regimen which she should continue following discharge   -it is important to note that goal BP for patient's at this age is SBP > 140      Consultations This Hospital Stay   PAIN MANAGEMENT ADULT IP CONSULT  PHYSICAL THERAPY ADULT IP CONSULT  OCCUPATIONAL THERAPY ADULT IP CONSULT  CARE MANAGEMENT / SOCIAL WORK IP CONSULT    Code Status   No CPR- Do NOT Intubate    Time Spent on this Encounter   I, JEREMY RAYGOZA MD, personally saw the patient today and spent greater than 30 minutes discharging this patient.       JEREMY RAYGOZA MD  [facility] [address]  [facility] [address]  Phone: [phone]  Fax: [phone]  ______________________________________________________________________    Physical Exam   Vital Signs: Temp: 98.1  F (36.7  C) Temp src: Oral BP: 114/68 Pulse: 64   Resp: 16 SpO2: 93 % O2 Device: None (Room air)    Weight: 135 lbs 12.85 oz    General Appearance:  Lying in bed, in mild distress.  Respiratory: Normal work of breathing, clear to auscultation bilaterally  Cardiovascular: Regular rate and rhythm, no murmurs rubs or gallops.  GI: NABS, soft abdomen to palpation  Skin: No visible rash  Other:  S/p right restoration, right eye patch in place       Primary Care Physician   Porfirio Garcia    Discharge Orders      Reason for your hospital stay    Presented to the hospital after undergoing evisceration of right eye. Hospital course complicated by difficult to control pain. Pain team was consulted and now patient will be discharged home oral oxycodone. Patient will also  follow-up with ophthalmology as scheduled.     Activity    Your activity upon discharge: activity as tolerated     Adult [facility] Follow-up and recommended labs and tests    Follow up with primary care provider, Porfirio Garcia, within 7 days for hospital follow- up.  No follow up labs or test are needed.    Patient will need to follow-up with ophthalmology       Appointments on [place] and/or [facility] (with [facility] or [facility] provider or service). Call [phone] if you haven't heard regarding these appointments within 7 days of discharge.     Diet    Follow this diet upon discharge: Orders Placed This Encounter      Regular Diet Adult       Significant Results and Procedures   Most Recent 3 CBC's:No lab results found.  Most Recent 3 BMP's:  Recent Labs   Lab Test 12/04/23  0744      POTASSIUM 3.6   CHLORIDE 101   CO2 28   BUN 15.7   CR 0.80   ANIONGAP 9   LEW 9.5   GLC 94     Most Recent 2 LFT's:No lab results found.  Most Recent 3 INR's:No lab results found., No results found for this or any previous visit.    Discharge Medications   Current Discharge Medication List        START taking these medications    Details   acetaminophen (TYLENOL) 325 MG tablet Take 3 tablets (975 mg) by mouth every 8 hours  Qty: 45 tablet, Refills: 0    Associated Diagnoses: Postoperative eye state      erythromycin (ROMYCIN) 5 MG/GM ophthalmic ointment Apply antibiotic ointment to all sutures three times a day, and 1/2 inch strip into the operated eye(s) at night after the patch is removed at your follow up visit.  Qty: 3.5 g, Refills: 1    Associated Diagnoses: Blind painful right eye      methocarbamol (ROBAXIN) 500 MG tablet Take 1 tablet (500 mg) by mouth 4 times daily  Qty: 60 tablet, Refills: 0    Associated Diagnoses: Postoperative eye state      oxyCODONE (ROXICODONE) 5 MG tablet Take 1 tablet (5 mg) by mouth every 4 hours as needed for moderate to severe pain  Qty: 15 tablet, Refills: 0    Associated Diagnoses:  Postoperative eye state      polyethylene glycol (MIRALAX) 17 g packet Take 17 g by mouth daily  Qty: 30 each, Refills: 0    Associated Diagnoses: Postoperative eye state           CONTINUE these medications which have CHANGED    Details   prednisoLONE acetate (PRED FORTE) 1 % ophthalmic suspension Place 1 drop Into the left eye 4 times daily  Qty: 10 mL, Refills: 0    Associated Diagnoses: Postoperative eye state      senna-docusate (SENOKOT-S/PERICOLACE) 8.6-50 MG tablet Take 2 tablets by mouth 2 times daily as needed for constipation  Qty: 30 tablet, Refills: 0    Associated Diagnoses: Postoperative eye state           CONTINUE these medications which have NOT CHANGED    Details   amLODIPine (NORVASC) 5 MG tablet Take 5 mg by mouth daily      aspirin (ASA) 81 MG chewable tablet Take 81 mg by mouth daily      B Complex-C-Folic Acid (DIALYVITE) TABS Take 1 tablet by mouth daily      carvedilol (COREG) 3.125 MG tablet Take 3.125 mg by mouth 2 times daily      cholecalciferol 50 MCG (2000 UT) CAPS Take 2,000 Units by mouth      esomeprazole (NEXIUM) 40 MG DR capsule Take 20 mg by mouth      furosemide (LASIX) 40 MG tablet       ipratropium (ATROVENT) 0.06 % nasal spray SHAKE LIQUID AND INHALE 2 SPRAYS INTO EACH NOSTRIL THREE TIMES DAILY AS NEEDED      potassium chloride ER (KLOR-CON M) 10 MEQ CR tablet       VENTOLIN  (90 Base) MCG/ACT inhaler       vitamin B-12 (CYANOCOBALAMIN) 1000 MCG tablet       clotrimazole-betamethasone (LOTRISONE) 1-0.05 % external cream APPLY TO AFFECTED AREA(S) TWO TIMES A DAY      mometasone (ELOCON) 0.1 % external solution       polyvinyl alcohol (LIQUIFILM TEARS) 1.4 % ophthalmic solution 1-2 drops           STOP taking these medications       acetaminophen-codeine (TYLENOL #3) 300-30 MG tablet Comments:   Reason for Stopping:         atropine 1 % ophthalmic solution Comments:   Reason for Stopping:         brimonidine (ALPHAGAN-P) 0.15 % ophthalmic solution Comments:   Reason for  Stopping:         dorzolamide-timolol (COSOPT) 2-0.5 % ophthalmic solution Comments:   Reason for Stopping:         fluorometholone (FML LIQUIFILM) 0.1 % ophthalmic suspension Comments:   Reason for Stopping:         HYDROcodone-acetaminophen (NORCO) 5-325 MG tablet Comments:   Reason for Stopping:         latanoprost (XALATAN) 0.005 % ophthalmic solution Comments:   Reason for Stopping:         oxyCODONE (OXY-IR) 5 MG capsule Comments:   Reason for Stopping:         timolol maleate (TIMOPTIC) 0.5 % ophthalmic solution Comments:   Reason for Stopping:             Allergies   Allergies   Allergen Reactions    Cefaclor Other (See Comments) and Rash     Edema and redness    Buspirone      Other reaction(s): Unknown Reaction  dizziness    Hydrochlorothiazide      Other reaction(s): Unknown Reaction    Lisinopril      Other reaction(s): Unknown Reaction  cough    Other Drug Allergy (See Comments) Other (See Comments)     Increases BP

## 2023-12-09 NOTE — PLAN OF CARE
Occupational Therapy Discharge Summary    Reason for therapy discharge:    Discharged to home with home therapy.    Progress towards therapy goal(s). See goals on Care Plan in [facility] electronic health record for goal details.  Goals partially met.  Barriers to achieving goals:   discharge from facility.    Therapy recommendation(s):    Continued therapy is recommended.  Rationale/Recommendations:  for safety and for visual adaptations in her home.

## 2023-12-09 NOTE — PROGRESS NOTES
Called patient and family to inform them that patient can discontinue taking prednisolone ointment since this was the part of her PT medication before admission.      JEREMY RAYGOZA MD  Hospitalist Service  [facility]

## 2023-12-11 ENCOUNTER — TELEPHONE (OUTPATIENT)
Dept: OPHTHALMOLOGY | Facility: CLINIC | Age: 88
End: 2023-12-11

## 2023-12-11 ENCOUNTER — OFFICE VISIT (OUTPATIENT)
Dept: OPHTHALMOLOGY | Facility: CLINIC | Age: 88
End: 2023-12-11
Payer: COMMERCIAL

## 2023-12-11 ENCOUNTER — TELEPHONE (OUTPATIENT)
Dept: OPHTHALMOLOGY | Facility: CLINIC | Age: 88
End: 2023-12-11
Payer: COMMERCIAL

## 2023-12-11 ENCOUNTER — NURSE TRIAGE (OUTPATIENT)
Dept: NURSING | Facility: CLINIC | Age: 88
End: 2023-12-11

## 2023-12-11 DIAGNOSIS — H54.40 BLIND PAINFUL RIGHT EYE: Primary | ICD-10-CM

## 2023-12-11 DIAGNOSIS — H57.11 BLIND PAINFUL RIGHT EYE: Primary | ICD-10-CM

## 2023-12-11 DIAGNOSIS — Z98.890 POSTOPERATIVE EYE STATE: ICD-10-CM

## 2023-12-11 PROCEDURE — 99024 POSTOP FOLLOW-UP VISIT: CPT | Mod: GC | Performed by: OPHTHALMOLOGY

## 2023-12-11 RX ORDER — ONDANSETRON 4 MG/1
4 TABLET, ORALLY DISINTEGRATING ORAL EVERY 8 HOURS PRN
Qty: 16 TABLET | Refills: 0 | Status: SHIPPED | OUTPATIENT
Start: 2023-12-11 | End: 2023-12-21

## 2023-12-11 NOTE — TELEPHONE ENCOUNTER
S: EYE PAIN    B/A: Daughter Milka calling in regards to her mother having excruciating eye pain. Her brother already called and is with the patient now. The daugter is on her way to her mom in [facility] and [facility]. She wants to know what they should do, if they should start getting pt ready to head into cities. She is upset that a call was already made over an hour ago and they have not heard back. I suggested to begin getting her mom ready and hopefully they will have heard back in that timeframe.     R: Assured her I would sent High Priority message regarding same encounter to [facility] and Dr Espinoza. She would like a prompt/urgent call back.  See previous encounters below.      Maxine Robertson, COA   to Shania Walsh MD Sills, Janelle, RN       12/11/23  9:17 AM   Would you please contact patient.  Thank you  Maya Eddy RN   to [facility]         12/11/23  8:53 AM   Pt son calling for pt- at the time of call he was not w/ pt. He is upset- may need call back ASAP. Pt w/ severe right eye pain.  See note, please contact Bill for plan. Thanks.  Maya Eddy RN         12/11/23  8:53 AM  Note     Son Jose Manuel calling- not with pt-   Pt/Anna - having increase,  right eye pain-.  Anna called Jose Manuel this AM, stated she took an oxycodone at 5 AM and it hasn't helped, the pain is severe.   Jose Manuel Would like to talk to Eye Doctor regarding pain.  Jose Manuel states - his wife is on her way to see pt/Anna, he will be going after this call.     Jose Manuel saw pt yesterday, pt had some eye pain, taking oxycodone - twice yesterday. Also had ointment applied and this seemed to keep pain under control.  Anna lives in assisted living, family has been administering pain med's.  Jose Manuel frustrated with the hole process of surgery and discharge - felt Anna should not have been sent home.         High priority note to eye clinic for call back  Jose Manuel's # [phone]     S/P 12-4-23  PREOPERATIVE  DIAGNOSIS:  Right  blind painful eye  POSTOPERATIVE DIAGNOSIS: Right blind painful eye  PROCEDURES PERFORMED:   1. Evisceration of right  with placement of a 18  mm silicone implant.   2. Temporary tarsorrhaphy, right.   SURGEON: Rosanne George MD.         Reason for Disposition    Patient wants to be seen    Protocols used: Eye Pain-A-[place]

## 2023-12-11 NOTE — PATIENT INSTRUCTIONS
Contact [facility]: [phone]  ([email or web address]):       [facility] Locations:    [facility] [place]/[facility] ([place], [place])  [facility] Office Building  [address]  [place], [address]  Office: [phone]  Toll Free: [phone]      [facility] [address]  [facility], [address]  Office: [phone]  Toll Free: [phone]      [facility] [place], [place]  [address], [address]  [place] [address]  Toll Free: [phone]

## 2023-12-11 NOTE — PROGRESS NOTES
Chief Complaint(s) and History of Present Illness(es)    Patient s/p evisceration of the right eye 12/4/23. Patient endorsing sharp   pain. States she is having difficulty sleeping.          Assessment & Plan     Anna Marrero is a 89 year old female with the following diagnoses:   Encounter Diagnoses   Name Primary?    Blind painful right eye Yes    Postoperative eye state          - Healing appropriately  - Discussed risks and benefits  of removing temporary tarso and patient decided to leave temporary tarso in place  - Unclear etiology of pain, possibly edges of conformer, though the lids and orbit are all soft, nothing appears tight. No signs of infection or surgical wound dehiscence   - Answered all questions    Plan  - Continue erythromycin alma rosa     Patient disposition:   Return in about 8 weeks (around 2/5/2024).       Rebekah Crump MD  PGY-2  Department of Ophthalmology  December 11, 2023 12:21 PM        Attending Physician Attestation: Complete documentation of historical and exam elements from today's encounter can be found in the full encounter summary report (not reduplicated in this progress note). I personally obtained the chief complaint(s) and history of present illness. I confirmed and edited as necessary the review of systems, past medical/surgical history, family history, social history, and examination findings as documented by others; and I examined the patient myself. I personally reviewed the relevant tests, images, and reports as documented above. I formulated and edited as necessary the assessment and plan and discussed the findings and management plan with the patient.  -Rosanne George MD         Patient

## 2023-12-11 NOTE — TELEPHONE ENCOUNTER
Son Jose Manuel calling- not with pt-   Pt/Anna - having increase,  right eye pain-.  Anna called Jose Manuel this AM, stated she took an oxycodone at 5 AM and it hasn't helped, the pain is severe.   Jose Manuel Would like to talk to Eye Doctor regarding pain.  Jose Manuel states - his wife is on her way to see pt/Anna, he will be going after this call.    Jose Manuel saw pt yesterday, pt had some eye pain, taking oxycodone - twice yesterday. Also had ointment applied and this seemed to keep pain under control.  Anna lives in assisted living, family has been administering pain med's.  Jose Manuel frustrated with the whole process of surgery and discharge - felt Anna should not have been sent home.       High priority note to eye clinic for call back  Jose Manuel's # [phone]    S/P 12-4-23  PREOPERATIVE DIAGNOSIS:  Right  blind painful eye  POSTOPERATIVE DIAGNOSIS: Right blind painful eye  PROCEDURES PERFORMED:   1. Evisceration of right  with placement of a 18  mm silicone implant.   2. Temporary tarsorrhaphy, right.   SURGEON: Rosanne George MD.     [facility] - [address]    Current med list: Jose Manuel states pt has plenty of oxycodone,   oxyCODONE (ROXICODONE) 5 MG tablet   15 tablet 0 12/8/2023  No  Sig - Route: Take 1 tablet (5 mg) by mouth every 4 hours as needed for moderate to severe pain - Oral  Class: Local Print    [facility] [address]        Reason for Disposition   Sounds like a serious complication to the triager    Additional Information   Negative: Sounds like a life-threatening emergency to the triager   Negative: Chest pain   Negative: Difficulty breathing   Negative: Acting confused (e.g., disoriented, slurred speech) or excessively sleepy   Negative: Surgical incision symptoms and questions   Negative: Pain or burning with passing urine (urination) and male   Negative: Pain or burning with passing urine (urination) and female   Negative: Constipation    "Negative: New or worsening leg (calf, thigh) pain   Negative: New or worsening leg swelling   Negative: Dizziness is severe, or persists > 24 hours after surgery   Negative: Symptoms arising from use of a urinary catheter (Arguello or Coude)   Negative: Cast problems or questions   Negative: Medication question   Negative: Bright red, wide-spread, sunburn-like rash   Negative: SEVERE headache and after spinal (epidural) anesthesia   Negative: Vomiting and persists > 4 hours   Negative: Vomiting and abdomen looks much more swollen than usual   Negative: Drinking very little and dehydration suspected (e.g., no urine > 12 hours, very dry mouth, very lightheaded)   Negative: Patient sounds very sick or weak to the triager    Answer Assessment - Initial Assessment Questions  1. SYMPTOM: \"What's the main symptom you're concerned about?\" (e.g., pain, fever, vomiting)  Son Jose Manuel calling for pt- his mother. He is not with pt.        Right eye pain- increased during the night-   Taking oxycodone: last took at 5AM.  Jose Manuel not sure of dosage, thought orders were take 1 every six hours.  Unsure if any eye drainage, swelling:  none yesterday when Jos eManuel saw Anna.    2. ONSET: \"When did   start?\"      Last night  3. SURGERY: \"What surgery did you have?\"      12-4-23  4. DATE of SURGERY: \"When was the surgery?\"      PREOPERATIVE DIAGNOSIS:  Right  blind painful eye  POSTOPERATIVE DIAGNOSIS: Right blind painful eye  PROCEDURES PERFORMED:   1. Evisceration of right  with placement of a 18  mm silicone implant.   2. Temporary tarsorrhaphy, right.   SURGEON: Rosanne George MD.     5. ANESTHESIA: \" What type of anesthesia did you have?\" (e.g., general, spinal, epidural, local)        6. PAIN: \"Is there any pain?\" If Yes, ask: \"How bad is it?\"  (Scale 1-10; or mild, moderate, severe)  See above  7. FEVER: \"Do you have a fever?\" If Yes, ask: \"What is your temperature, how was it measured, and when did it start?\"      Does not think  so, none " "yesterday.  Jose Manuel will check when he see' Anna    8. VOMITING: \"Is there any vomiting?\" If Yes, ask: \"How many times?\"      none  9. BLEEDING: \"Is there any bleeding?\" If Yes, ask: \"How much?\" and \"Where?\"  As of yesterday no eye drainage,         10. OTHER SYMPTOMS: \"Do you have any other symptoms?\" (e.g., drainage from wound, painful urination, constipation)        Constipation: unsure of last BM- thought she had a BM last night  Taking stool softner and metamucil    Protocols used: Post-Op Symptoms and Okyknbrja-O-KX    "

## 2023-12-11 NOTE — TELEPHONE ENCOUNTER
Caller reporting the following red-flag symptom(s): Eye pain per daughter Milka.    Per the system red-flag symptom policy, patient was instructed to:  speak with a Registered Nurse    Action:  Patient warm transferred to a Registered Nurse Eye pa

## 2023-12-11 NOTE — TELEPHONE ENCOUNTER
[facility] Call Center    Phone Message    May a detailed message be left on voicemail: yes     Reason for Call: Other: Daughter Milka calling in to find out how long patient should be on methocarbamol (ROBAXIN) 500 MG tablet.     Please call Milka back at  [phone]. Thank you.    Action Taken: Message routed to:  Adult Clinics: Eye p [phone]    Travel Screening: Not Applicable

## 2023-12-15 ENCOUNTER — NURSE TRIAGE (OUTPATIENT)
Dept: NURSING | Facility: CLINIC | Age: 88
End: 2023-12-15
Payer: COMMERCIAL

## 2023-12-15 ENCOUNTER — TELEPHONE (OUTPATIENT)
Dept: OPHTHALMOLOGY | Facility: CLINIC | Age: 88
End: 2023-12-15
Payer: COMMERCIAL

## 2023-12-15 ENCOUNTER — TRANSFERRED RECORDS (OUTPATIENT)
Dept: MULTI SPECIALTY CLINIC | Facility: CLINIC | Age: 88
End: 2023-12-15

## 2023-12-15 LAB
CREATININE (EXTERNAL): 0.77 MG/DL (ref 0.57–1.11)
GFR ESTIMATED (EXTERNAL): >60 ML/MIN/1.73M2
GLUCOSE (EXTERNAL): 105 MG/DL (ref 70–100)
POTASSIUM (EXTERNAL): 4.8 MMOL/L (ref 3.5–5.1)

## 2023-12-15 NOTE — TELEPHONE ENCOUNTER
Spoke with patient's daughter-in-law regarding scheduling a Return visit on Tuesday with Dr. George. Provided Eye Clinic and direct number to be passed on to other family members for scheduling..

## 2023-12-15 NOTE — TELEPHONE ENCOUNTER
Patient's daughter returned VM regarding scheduling a Return visit on Tuesday with Dr. George.Scheduled patient accordingly and provided appointment details over the phone.-Per Patient's Daughter

## 2023-12-15 NOTE — TELEPHONE ENCOUNTER
LVM for patient's daughter regarding scheduling a Return visit on Tuesday with Dr. George. Provided direct number for scheduling options.

## 2023-12-15 NOTE — TELEPHONE ENCOUNTER
"  Daughter Milka calling:  Anna having severe right eye pain  Oxycodone and tylenol not helping   at [facility]--family states wait time up to 3-4 hours  Calling asking for another type of pain med or recommendation on managing the pain. Would like to talk to Dr. Lay. Asking about morphine for Anna  See attached note    High priority note to  Eye Clinic for call back to Milka Jarquin # [phone]      S/P 12-4-23  PREOPERATIVE DIAGNOSIS:  Right  blind painful eye  POSTOPERATIVE DIAGNOSIS: Right blind painful eye  PROCEDURES PERFORMED:   1. Evisceration of right  with placement of a 18  mm silicone implant.   2. Temporary tarsorrhaphy, right.   SURGEON: Rosanne George MD.    [facility] [address]       Reason for Disposition   Severe eye pain    Additional Information   Negative: Followed an eye injury   Negative: Eye pain from chemical in the eye   Negative: Eye pain from foreign body in eye   Negative: Has sinus pain or pressure   Negative: Complete loss of vision in one or both eyes   Negative: Eyelids are very swollen (shut or almost) and fever   Negative: Eyelid (outer) is very red and fever   Negative: Foreign body sensation ('feels like something is in there') and irrigation didn't help   Negative: Vomiting   Negative: Ulcer or sore seen on the cornea (clear center part of the eye)   Negative: Recent eye surgery and increasing eye pain   Negative: Blurred vision and new or worsening   Negative: Patient sounds very sick or weak to the triager   Negative: Eye pain/discomfort and more than mild   Negative: Eyelids are very swollen (shut or almost) and no fever   Negative: Painful rash near eye and multiple small blisters grouped together   Negative: Pain followed bright light exposure from welding   Negative: Looking at light causes severe pain (i.e., photophobia)    Answer Assessment - Initial Assessment Questions  1. ONSET: \"When did the pain start?\" (e.g., " "minutes, hours, days)   Severe right  eye pain-  Wondering about-Morphine  2. TIMING: \"Does the pain come and go, or has it been constant since it started?\" (e.g., constant, intermittent, fleeting)      Constant- severe  Denies any drainage or swelling    pain  meds not working taking every 4- 6 hours  oxyCODONE (ROXICODONE) 5 MG tablet 15 tablet 0 12/8/2023  No  Sig - Route: Take 1 tablet (5 mg) by mouth every 4 hours as needed for moderate to severe pain - Oral  Class: Local Print    acetaminophen (TYLENOL) 325 MG tablet 45 tablet 0 12/8/2023  No  Sig - Route: Take 3 tablets (975 mg) by mouth every 8 hours - Oral    3. SEVERITY: \"How bad is the pain?\"   (Scale 1-10; mild, moderate or severe)    - MILD (1-3): doesn't interfere with normal activities     - MODERATE (4-7): interferes with normal activities or awakens from sleep     - SEVERE (8-10): excruciating pain and patient unable to do normal activities      8-10  4. LOCATION: \"Where does it hurt?\"  (e.g., eyelid, eye, cheekbone)      Right eye  5. CAUSE: \"What do you think is causing the pain?\"      Unknown - no truama  6. VISION: \"Do you have blurred vision or changes in your vision?\"       na  7. EYE DISCHARGE: \"Is there any discharge (pus) from the eye(s)?\"  If yes, ask: \"What color is it?\"       none  8. FEVER: \"Do you have a fever?\" If so, ask: \"What is it, how was it measured, and when did it start?\"       No, denies chills/sweats  9. OTHER SYMPTOMS: \"Do you have any other symptoms?\" (e.g., headache, nasal discharge, facial rash)      none  10. PREGNANCY: \"Is there any chance you are pregnant?\" \"When was your last menstrual period?\"    Protocols used: Eye Pain-A-OH    "

## 2023-12-15 NOTE — TELEPHONE ENCOUNTER
Caller reporting the following red-flag symptom(s): Extreme eye pain after surgery    Per the system red-flag symptom policy, patient was instructed to:  speak with a Registered Nurse    Action:  Patient warm transferred to a Registered NurseMaya

## 2023-12-19 ENCOUNTER — OFFICE VISIT (OUTPATIENT)
Dept: OPHTHALMOLOGY | Facility: CLINIC | Age: 88
End: 2023-12-19
Payer: COMMERCIAL

## 2023-12-19 DIAGNOSIS — Z98.890 POSTOPERATIVE EYE STATE: Primary | ICD-10-CM

## 2023-12-19 PROCEDURE — 99024 POSTOP FOLLOW-UP VISIT: CPT | Mod: GC | Performed by: OPHTHALMOLOGY

## 2023-12-19 RX ORDER — OXYCODONE HYDROCHLORIDE 5 MG/1
5 TABLET ORAL EVERY 6 HOURS PRN
Qty: 20 TABLET | Refills: 0 | Status: SHIPPED | OUTPATIENT
Start: 2023-12-19 | End: 2023-12-24

## 2023-12-19 ASSESSMENT — TONOMETRY
OS_IOP_MMHG: 11
IOP_METHOD: TONOPEN
OD_IOP_MMHG: XX

## 2023-12-19 ASSESSMENT — SLIT LAMP EXAM - LIDS: COMMENTS: NORMAL

## 2023-12-19 ASSESSMENT — VISUAL ACUITY
OS_CC: 20/100
METHOD: SNELLEN - LINEAR

## 2023-12-19 NOTE — LETTER
2023         RE:  :  MRN: Anna Marrero  1927  [id number]     Dear Dr. Carter,    Our mutual patient, Anna Marrero, returned for oculoplastic follow up. My assessment and plan are below. In summary, I agree that she would really benefit from evaluation with a pain specialist. They would like to arrange evaluation in [place], as such we would request your help in arranging this.     Chief Complaint(s) and History of Present Illness(es)     Follow Up For Surgery Of Eye            Laterality: both eyes    Associated symptoms: Negative for redness, jaw claudication, photophobia   and foreign body sensation    Pain scale: 10/10          Comments    Patient states pain is 10/10 and she took oxycodone at 5am and then at 9am   5mg. Patient states headaches and nausea Patient states tearing and   burning right eye and FBS.     JOANNE Montemayor 2023 2:29 PM        Assessment & Plan     Anna Marrero is a 89 year old female with the following diagnoses:     ICD-10-CM    1. Postoperative eye state  Z98.890         - Healing appropriately.  - Unclear etiology of pain, possibly edges of conformer, though the lids and orbit are all soft, nothing appears tight. No signs of infection or surgical wound dehiscence. Discussed risks and benefits of removing temporary tarso to exchange medium conformer for small conformer. Patient agreed to proceed. Temporary tarso severed, post-operative conformer removed, small conformer placed.  - Answered all questions.  - Prescribed short course of pain medication on request. Discussed with the patient and her daughters that we are unable to prescribe long term pain medication for the patient. Strongly recommended following up with a pain specialist for long term pain control. Patient's family plans to arrange an appointment with the pain clinic as well as follow up with her PCP.    Patient disposition:   Return in about 6 weeks (around 2024) for Follow  Up.     Shania Walsh MD  Oculoplastic Surgery Fellow        Again, thank you for allowing me to participate in the care of your patient.      Sincerely,    Rosanne George MD  Department of Ophthalmology and Visual Neurosciences  [facility]      CC: Maya Carter MD  [address] [place] [address]  [address]  [facility] [id number]  Via Outside Provider Messaging

## 2023-12-19 NOTE — NURSING NOTE
Chief Complaints and History of Present Illnesses   Patient presents with    Follow Up For Surgery Of Eye     Chief Complaint(s) and History of Present Illness(es)       Follow Up For Surgery Of Eye              Laterality: both eyes    Associated symptoms: Negative for redness, jaw claudication, photophobia and foreign body sensation    Pain scale: 10/10              Comments    Patient states pain is 10/10 and she took oxycodone at 5am and then at 9am 5mg. Patient states headaches and nausea Patient states tearing and burning right eye and FBS.     Kim Wu, JOANNE December 19, 2023 2:29 PM

## 2023-12-19 NOTE — PROGRESS NOTES
Chief Complaint(s) and History of Present Illness(es)     Follow Up For Surgery Of Eye            Laterality: both eyes    Associated symptoms: Negative for redness, jaw claudication, photophobia   and foreign body sensation    Pain scale: 10/10          Comments    Patient states pain is 10/10 and she took oxycodone at 5am and then at 9am   5mg. Patient states headaches and nausea Patient states tearing and   burning right eye and FBS.     JOANNE Montemayor December 19, 2023 2:29 PM        Assessment & Plan     Anna Marrero is a 89 year old female with the following diagnoses:     ICD-10-CM    1. Postoperative eye state  Z98.890         - Healing appropriately.  - Unclear etiology of pain, possibly edges of conformer, though the lids and orbit are all soft, nothing appears tight. No signs of infection or surgical wound dehiscence. Discussed risks and benefits of removing temporary tarso to exchange medium conformer for small conformer. Patient agreed to proceed. Temporary tarso severed, post-operative conformer removed, small conformer placed.  - Answered all questions.  - Prescribed short course of pain medication on request. Discussed with the patient and her daughters that we are unable to prescribe long term pain medication for the patient. Strongly recommended following up with a pain specialist for long term pain control. Patient's family plans to arrange an appointment with the pain clinic as well as follow up with her PCP.    Patient disposition:   Return in about 6 weeks (around 1/30/2024) for Follow Up.     Shania Walsh MD  Oculoplastic Surgery Fellow       Attending Physician Attestation: Complete documentation of historical and exam elements from today's encounter can be found in the full encounter summary report (not reduplicated in this progress note). I personally obtained the chief complaint(s) and history of present illness. I confirmed and edited as necessary the review of systems, past  medical/surgical history, family history, social history, and examination findings as documented by others; and I examined the patient myself. I personally reviewed the relevant tests, images, and reports as documented above. I formulated and edited as necessary the assessment and plan and discussed the findings and management plan with the patient.  -Rosanne George MD

## 2023-12-21 ENCOUNTER — TELEPHONE (OUTPATIENT)
Dept: OPHTHALMOLOGY | Facility: CLINIC | Age: 88
End: 2023-12-21
Payer: COMMERCIAL

## 2023-12-21 DIAGNOSIS — H54.40 BLIND PAINFUL RIGHT EYE: ICD-10-CM

## 2023-12-21 DIAGNOSIS — H57.11 BLIND PAINFUL RIGHT EYE: ICD-10-CM

## 2023-12-21 RX ORDER — ONDANSETRON 4 MG/1
4 TABLET, ORALLY DISINTEGRATING ORAL EVERY 8 HOURS PRN
Qty: 20 TABLET | Refills: 4 | Status: SHIPPED | OUTPATIENT
Start: 2023-12-21

## 2023-12-21 NOTE — TELEPHONE ENCOUNTER
Called and let pt know that refill was sent in to pharmacy.     Venice Lopez, COT, 9:38 AM 12/21/2023

## 2023-12-21 NOTE — TELEPHONE ENCOUNTER
[facility] Call Center    Phone Message    May a detailed message be left on voicemail: yes     Reason for Call: Medication Refill Request    Has the patient contacted the pharmacy for the refill? Yes   Name of medication being requested: ondansetron (ZOFRAN ODT) 4 MG ODT tab   Provider who prescribed the medication: Doris Johnson  Pharmacy:   [facility] [address]     Date medication is needed: ASAP . PT IS OUT OF MEDICATION       Action Taken: Other: EYE    Travel Screening: Not Applicable

## 2023-12-26 ENCOUNTER — TELEPHONE (OUTPATIENT)
Dept: OPHTHALMOLOGY | Facility: CLINIC | Age: 88
End: 2023-12-26
Payer: COMMERCIAL

## 2023-12-26 NOTE — TELEPHONE ENCOUNTER
[facility] Call Center    Phone Message    May a detailed message be left on voicemail: yes     Reason for Call: Appointment Intake    Referring Provider Name: Elmo Jeong MD   Diagnosis and/or Symptoms: Hospital follow up appt    Action Taken: Message routed to:  [facility] eye    Travel Screening: Not Applicable

## 2023-12-26 NOTE — TELEPHONE ENCOUNTER
Called and spoke to the daughter In law     She declined a ED follow up and felt Anna did not need to be seen     Anna has an appointment with dr. Lay in [place] [facility] on 12/26/2023 at 9:55 AM

## 2023-12-30 DIAGNOSIS — Z98.890 POSTOPERATIVE EYE STATE: ICD-10-CM

## 2024-01-03 RX ORDER — OXYCODONE HYDROCHLORIDE 5 MG/1
5 TABLET ORAL EVERY 4 HOURS PRN
Qty: 15 TABLET | OUTPATIENT
Start: 2024-01-03

## 2024-01-03 NOTE — TELEPHONE ENCOUNTER
Discussed at patient visit that last narcotics prescription was the final one and she will need to arrange follow up with PCP and pain specialist.

## 2024-01-03 NOTE — TELEPHONE ENCOUNTER
oxyCODONE (ROXICODONE) 5 MG tablet     Last Written Prescription Date:  12/8/2023  Last Fill Quantity: 15,   # refills: 0  Last Office Visit : 12/9/2023  Future Office visit:  2/14/2024    Routing refill request to provider for review/approval because:  Drug not on the [facility], [facility] or [facility] refill protocol or controlled substance    Iris Jordan RN  Central Triage Red Flags/Med Refills

## 2024-01-31 ENCOUNTER — TELEPHONE (OUTPATIENT)
Dept: OPHTHALMOLOGY | Facility: CLINIC | Age: 89
End: 2024-01-31
Payer: COMMERCIAL

## 2024-01-31 NOTE — TELEPHONE ENCOUNTER
[facility] Call Center    Phone Message    May a detailed message be left on voicemail: no     Reason for Call: per caller Kimi DOS 12/4/23 caller stated pt would like Dr George to call her or his care team at PH# [phone]  caller advises pt has questions regarding her eye being removed back on 12/4/23, caller states there is alot of weaping for a couple weeks, mild discomfort, pt is questioning the salve that is not medicated. Please contact pt at number in message to discuss. Thank you!    Action Taken: Message routed to:  [facility] eye    Travel Screening: Not Applicable

## 2024-02-01 NOTE — TELEPHONE ENCOUNTER
Spoke with pt. She is experiencing on and off discomfort and tearing, having difficulty finding refresh gel/ointment. Discussed ok to try to find Systane gel/ointment to use. Patient asked if she can use the same drops she uses in her left eye, let her know that would be ok however an ointment is longer lasting. Pt has been doing warm compresses and that seems to help relieve her symptoms. Patient does have PO scheduled with Dr. Lay on 2/14/24, advised to continue to monitor things and if she were to start experiencing more pain/change in discharge/redness to call and we could see her sooner. Did let her know that Dr. Lay is out next week so she would have to see a different provider if something were to come up before her scheduled PO. Pt voiced understanding and had no further questions.     Venice Lopez, ELMIRA, 9:23 AM 02/01/2024

## 2024-02-14 ENCOUNTER — OFFICE VISIT (OUTPATIENT)
Dept: OPHTHALMOLOGY | Facility: CLINIC | Age: 89
End: 2024-02-14
Payer: COMMERCIAL

## 2024-02-14 DIAGNOSIS — Z90.01 ACQUIRED ANOPHTHALMOS: Primary | ICD-10-CM

## 2024-02-14 PROCEDURE — 99024 POSTOP FOLLOW-UP VISIT: CPT | Performed by: OPHTHALMOLOGY

## 2024-02-14 ASSESSMENT — CONF VISUAL FIELD
OD_INFERIOR_NASAL_RESTRICTION: 1
OS_SUPERIOR_TEMPORAL_RESTRICTION: 0
OD_INFERIOR_TEMPORAL_RESTRICTION: 1
OS_NORMAL: 1
METHOD: COUNTING FINGERS
OD_SUPERIOR_TEMPORAL_RESTRICTION: 1
OS_INFERIOR_NASAL_RESTRICTION: 0
OS_SUPERIOR_NASAL_RESTRICTION: 0
OD_SUPERIOR_NASAL_RESTRICTION: 1
OS_INFERIOR_TEMPORAL_RESTRICTION: 0

## 2024-02-14 ASSESSMENT — TONOMETRY
IOP_METHOD: ICARE
OS_IOP_MMHG: 12

## 2024-02-14 ASSESSMENT — VISUAL ACUITY
OS_SC: 20/100
METHOD: SNELLEN - LINEAR

## 2024-02-14 NOTE — PROGRESS NOTES
Chief Complaint(s) and History of Present Illness(es)     Post Op (Ophthalmology) Right Eye            Laterality: right eye          Comments    S/p 1. Evisceration of right  with placement of a 18  mm silicone implant.   2. Temporary tarsorrhaphy, right. 12/4/23. Patient states the right side   always feels like there is something in it. Lots of tearing from that side   as well. Pt using erythromycin ointment and Systane ointment a couple of   times per day.     Pt does not want to get prosthesis.         Assessment & Plan     Anna Marrero is a 89 year old female with the following diagnoses:   Encounter Diagnosis   Name Primary?    Acquired anophthalmos Yes     No longer having pain but has irritation.   Long circuitous discussion about options.  I recommend she get a prosthesis.   She is not very interested in getting a prosthesis.    We also discussed if she is very irritated and absolutely certain she doesn't want a prosthesis, we could just remove her conformer.   She will consider her options. She has an appointment for he prosthesis next week.    She can continue ees alma rosa or artificial tear ointment.      Attending Physician Attestation: Complete documentation of historical and exam elements from today's encounter can be found in the full encounter summary report (not reduplicated in this progress note). I personally obtained the chief complaint(s) and history of present illness. I confirmed and edited as necessary the review of systems, past medical/surgical history, family history, social history, and examination findings as documented by others; and I examined the patient myself. I personally reviewed the relevant tests, images, and reports as documented above. I formulated and edited as necessary the assessment and plan and discussed the findings and management plan with the patient.  -Rosanne George MD

## 2024-11-26 ENCOUNTER — TRANSFERRED RECORDS (OUTPATIENT)
Dept: HEALTH INFORMATION MANAGEMENT | Facility: CLINIC | Age: 89
End: 2024-11-26
Payer: COMMERCIAL

## 2025-01-27 ENCOUNTER — TELEPHONE (OUTPATIENT)
Dept: OPHTHALMOLOGY | Facility: CLINIC | Age: OVER 89
End: 2025-01-27
Payer: COMMERCIAL

## 2025-01-27 NOTE — TELEPHONE ENCOUNTER
Spoke with patient's daughter, Milka, regarding scheduling a Return for follow up visit with Dr. George to check on the anophthalmic side. Scheduled patient accordingly for March as family requested in [facility] and sent reminder letter to confirmed address. -Appt Per PT's Daughter, Milka

## 2025-01-27 NOTE — TELEPHONE ENCOUNTER
Called patient's daughter to discuss instructions.  Could you also please call the patient/facility for a follow up visit with Dr. George to check on the anophthalmic side? She was last seen last February.

## 2025-01-27 NOTE — TELEPHONE ENCOUNTER
Anna Marrero called stating that she was nearly out of the eye ointment Dr George prescribed for her about a year ago after her surgery.  Should she continue using it?  Please call at [phone].

## 2025-02-25 ENCOUNTER — TRANSFERRED RECORDS (OUTPATIENT)
Dept: HEALTH INFORMATION MANAGEMENT | Facility: CLINIC | Age: OVER 89
End: 2025-02-25

## 2025-04-15 ENCOUNTER — OFFICE VISIT (OUTPATIENT)
Dept: OPHTHALMOLOGY | Facility: CLINIC | Age: OVER 89
End: 2025-04-15
Payer: COMMERCIAL

## 2025-04-15 DIAGNOSIS — H57.11 PAIN AROUND RIGHT EYE: ICD-10-CM

## 2025-04-15 DIAGNOSIS — Z90.01 ACQUIRED ANOPHTHALMOS: Primary | ICD-10-CM

## 2025-04-15 RX ORDER — MULTIVITAMIN
TABLET ORAL
COMMUNITY

## 2025-04-15 RX ORDER — CHOLECALCIFEROL (VITAMIN D3) 50 MCG
TABLET ORAL
COMMUNITY
Start: 2024-09-16

## 2025-04-15 RX ORDER — ERYTHROMYCIN 5 MG/G
OINTMENT OPHTHALMIC
Qty: 3.5 G | Refills: 11 | Status: SHIPPED | OUTPATIENT
Start: 2025-04-15

## 2025-04-15 ASSESSMENT — CONF VISUAL FIELD
OD_INFERIOR_TEMPORAL_RESTRICTION: 1
OD_INFERIOR_NASAL_RESTRICTION: 1
OS_SUPERIOR_TEMPORAL_RESTRICTION: 1
OD_SUPERIOR_TEMPORAL_RESTRICTION: 1
OS_SUPERIOR_NASAL_RESTRICTION: 1
OD_SUPERIOR_NASAL_RESTRICTION: 1

## 2025-04-15 ASSESSMENT — VISUAL ACUITY
METHOD: SNELLEN - LINEAR
OS_SC: 20/400
OD_SC: PROSTHESIS

## 2025-04-15 ASSESSMENT — ENCOUNTER SYMPTOMS: JOINT SWELLING: 1

## 2025-04-15 ASSESSMENT — TONOMETRY
OS_IOP_MMHG: 17
IOP_METHOD: TONOPEN

## 2025-04-15 ASSESSMENT — SLIT LAMP EXAM - LIDS: COMMENTS: NORMAL

## 2025-04-15 NOTE — NURSING NOTE
Chief Complaints and History of Present Illnesses   Patient presents with    Follow Up     Acquired anophthalmos right eye      Chief Complaint(s) and History of Present Illness(es)       Follow Up              Laterality: right eye    Course: stable    Associated symptoms: eye pain, tearing and foreign body sensation (gives a picking sensation, right eye is irritated in the right corner).  Negative for discharge    Treatments tried: artificial tears and ointment    Pain scale: 8/10    Comments: Acquired anophthalmos right eye               Comments    She states that the nerve pain has resolved but she now has a FB sensationin her right eye that constantly bothers her.  She tells me that she was told by  Kristofer Loomis that maybe the wrong size conformer or implant was put in the eye and air maybe getting into the right eye causing a bubble to form.    She is using Erythromycin every night and Refresh PM as well as drops during the day.    Ena Hylton, COT 2:55 PM  April 15, 2025

## 2025-04-15 NOTE — PROGRESS NOTES
Chief Complaint(s) and History of Present Illness(es)     Follow Up            Laterality: right eye    Course: stable    Associated symptoms: eye pain, tearing and foreign body sensation (gives   a picking sensation, right eye is irritated in the right corner).    Negative for discharge    Treatments tried: artificial tears and ointment    Pain scale: 8/10    Comments: Acquired anophthalmos right eye           Comments    She states that the nerve pain has resolved but she now has a FB   sensationin her right eye that constantly bothers her.  She tells me that   she was told by  Kristofer Loomis that maybe the wrong size conformer or   implant was put in the eye and air maybe getting into the right eye   causing a bubble to form.    She is using Erythromycin every night and Refresh PM as well as drops   during the day.    Ena Hylton, COT 2:55 PM  April 15, 2025     Assessment & Plan     Anna Marrero is a 89 year old female with the following diagnoses:     ICD-10-CM    1. Acquired anophthalmos  Z90.01 erythromycin (ROMYCIN) 5 MG/GM ophthalmic ointment      2. Pain around right eye  H57.11 erythromycin (ROMYCIN) 5 MG/GM ophthalmic ointment          Patient has had her conformer in for over a year, she did not come back for other scheduled appointments. Referred by optometry for evaluation. Patient still does not want her conformer.     Yong Oneil MD  PGY-2 Ophthalmology Resident    Does not want to have a custom prosthesis given her age.   We removed her prosthesis today.  She feels ees alma rosa helps, but at some point was under the impression she cannot use more than once daily. She can use it as frequently as she likes and was provided a script for four times a day.     Attending Physician Attestation: Complete documentation of historical and exam elements from today's encounter can be found in the full encounter summary report (not reduplicated in this progress note). I personally obtained the chief  complaint(s) and history of present illness. I confirmed and edited as necessary the review of systems, past medical/surgical history, family history, social history, and examination findings as documented by others; and I examined the patient myself. I personally reviewed the relevant tests, images, and reports as documented above. I formulated and edited as necessary the assessment and plan and discussed the findings and management plan with the patient.  -Rosanne George MD

## (undated) DEVICE — COVER CAMERA IN-LIGHT DISP LT-C02

## (undated) DEVICE — DRSG EYE PAD STERILE 1.63X2.63" NON21600

## (undated) DEVICE — PEN MARKING SKIN VISIMARK 1424SR

## (undated) DEVICE — LINEN TOWEL PACK X5 5464

## (undated) DEVICE — POSITIONER ARMBOARD FOAM 1PAIR LF FP-ARMB1

## (undated) DEVICE — GLOVE BIOGEL PI MICRO SZ 7.5 48575

## (undated) DEVICE — PACK MINOR EYE

## (undated) DEVICE — ESU HOLSTER PLASTIC DISP E2400

## (undated) DEVICE — SOL ADH LIQUID BENZOIN SWAB 0.6ML C1544

## (undated) DEVICE — EYE PREP BETADINE 5% SOLUTION 30ML 0065-0411-30

## (undated) DEVICE — ESU CORD BIPOLAR GREEN 10-4000

## (undated) DEVICE — SU MONOCRYL 5-0 P-3 18" UND Y493G

## (undated) DEVICE — STRAP KNEE/BODY 31143004

## (undated) DEVICE — EYE NDL RETROBULBAR ATKINSON 25GA 1.5" 581637

## (undated) DEVICE — NDL 30GA 0.5" 305106

## (undated) DEVICE — SYR 03ML LL W/O NDL 309657

## (undated) DEVICE — SU PLAIN 6-0 G-1DA 18" 770G

## (undated) DEVICE — TAPE ELASTIKON 2" LATEX

## (undated) DEVICE — TUBING SUCTION MEDI-VAC SOFT 3/16"X20' N520A

## (undated) DEVICE — SPECIMEN CONTAINER 5OZ STERILE 2600SA

## (undated) DEVICE — Device

## (undated) DEVICE — EYE KNIFE SLIT XSTAR VISITEC 3.0MM 45DEG 373730

## (undated) RX ORDER — HYDRALAZINE HYDROCHLORIDE 20 MG/ML
INJECTION INTRAMUSCULAR; INTRAVENOUS
Status: DISPENSED
Start: 2023-12-04

## (undated) RX ORDER — ACETAMINOPHEN 325 MG/1
TABLET ORAL
Status: DISPENSED
Start: 2023-12-04

## (undated) RX ORDER — ONDANSETRON 2 MG/ML
INJECTION INTRAMUSCULAR; INTRAVENOUS
Status: DISPENSED
Start: 2023-12-04

## (undated) RX ORDER — CEFAZOLIN SODIUM/WATER 2 G/20 ML
SYRINGE (ML) INTRAVENOUS
Status: DISPENSED
Start: 2023-12-04

## (undated) RX ORDER — DEXAMETHASONE SODIUM PHOSPHATE 4 MG/ML
INJECTION, SOLUTION INTRA-ARTICULAR; INTRALESIONAL; INTRAMUSCULAR; INTRAVENOUS; SOFT TISSUE
Status: DISPENSED
Start: 2023-12-04

## (undated) RX ORDER — FENTANYL CITRATE 50 UG/ML
INJECTION, SOLUTION INTRAMUSCULAR; INTRAVENOUS
Status: DISPENSED
Start: 2023-12-04